# Patient Record
Sex: FEMALE | Race: WHITE | Employment: OTHER | URBAN - METROPOLITAN AREA
[De-identification: names, ages, dates, MRNs, and addresses within clinical notes are randomized per-mention and may not be internally consistent; named-entity substitution may affect disease eponyms.]

---

## 2017-03-18 RX ORDER — LEVOTHYROXINE SODIUM 75 UG/1
TABLET ORAL
Qty: 90 TAB | Refills: 3 | Status: SHIPPED | OUTPATIENT
Start: 2017-03-18 | End: 2018-02-15 | Stop reason: SDUPTHER

## 2017-03-22 ENCOUNTER — HOSPITAL ENCOUNTER (OUTPATIENT)
Dept: MAMMOGRAPHY | Age: 77
Discharge: HOME | End: 2017-03-22
Attending: FAMILY MEDICINE
Payer: MEDICARE

## 2017-03-22 DIAGNOSIS — Z12.31 VISIT FOR SCREENING MAMMOGRAM: ICD-10-CM

## 2017-03-22 PROCEDURE — 77067 SCR MAMMO BI INCL CAD: CPT

## 2017-07-26 ENCOUNTER — TELEPHONE (OUTPATIENT)
Dept: ENDOCRINOLOGY | Age: 77
End: 2017-07-26

## 2017-07-26 DIAGNOSIS — M81.0 AGE-RELATED OSTEOPOROSIS WITHOUT CURRENT PATHOLOGICAL FRACTURE: Primary | ICD-10-CM

## 2017-07-26 DIAGNOSIS — E23.2 DIABETES INSIPIDUS (HCC): Primary | ICD-10-CM

## 2017-07-26 DIAGNOSIS — E03.8 SECONDARY HYPOTHYROIDISM: ICD-10-CM

## 2017-07-26 DIAGNOSIS — M81.0 AGE-RELATED OSTEOPOROSIS WITHOUT CURRENT PATHOLOGICAL FRACTURE: ICD-10-CM

## 2017-08-03 ENCOUNTER — TELEPHONE (OUTPATIENT)
Dept: ENDOCRINOLOGY | Age: 77
End: 2017-08-03

## 2017-08-03 NOTE — TELEPHONE ENCOUNTER
Patient wanted to know if how she should do her desmopressin with her doing the prep for the colonoscopy.

## 2017-08-03 NOTE — TELEPHONE ENCOUNTER
Patient called to ask some questions about a colonoscopy she will be having on August 30th. She can be reached at:  [phone].

## 2017-08-10 ENCOUNTER — HOSPITAL ENCOUNTER (OUTPATIENT)
Dept: BONE DENSITY | Age: 77
Discharge: HOME | End: 2017-08-10
Attending: INTERNAL MEDICINE
Payer: MEDICARE

## 2017-08-10 DIAGNOSIS — M81.0 AGE-RELATED OSTEOPOROSIS WITHOUT CURRENT PATHOLOGICAL FRACTURE: ICD-10-CM

## 2017-08-10 PROCEDURE — 77080 DXA BONE DENSITY AXIAL: CPT

## 2017-08-24 LAB
25(OH)D3+25(OH)D2 SERPL-MCNC: 48.1 NG/ML (ref 30–100)
BUN SERPL-MCNC: 15 MG/DL (ref 8–27)
BUN/CREAT SERPL: 19 (ref 12–28)
CALCIUM SERPL-MCNC: 9.6 MG/DL (ref 8.7–10.3)
CHLORIDE SERPL-SCNC: 99 MMOL/L (ref 96–106)
CO2 SERPL-SCNC: 26 MMOL/L (ref 18–29)
CREAT SERPL-MCNC: 0.8 MG/DL (ref 0.57–1)
GLUCOSE SERPL-MCNC: 84 MG/DL (ref 65–99)
POTASSIUM SERPL-SCNC: 4 MMOL/L (ref 3.5–5.2)
PROLACTIN SERPL-MCNC: 0.1 NG/ML (ref 4.8–23.3)
SODIUM SERPL-SCNC: 140 MMOL/L (ref 134–144)
T4 FREE SERPL-MCNC: 1.53 NG/DL (ref 0.82–1.77)

## 2017-08-29 ENCOUNTER — OFFICE VISIT (OUTPATIENT)
Dept: ENDOCRINOLOGY | Age: 77
End: 2017-08-29

## 2017-08-29 ENCOUNTER — TELEPHONE (OUTPATIENT)
Dept: ENDOCRINOLOGY | Age: 77
End: 2017-08-29

## 2017-08-29 VITALS
HEART RATE: 61 BPM | SYSTOLIC BLOOD PRESSURE: 146 MMHG | HEIGHT: 62 IN | DIASTOLIC BLOOD PRESSURE: 59 MMHG | BODY MASS INDEX: 20.83 KG/M2 | WEIGHT: 113.2 LBS

## 2017-08-29 DIAGNOSIS — E03.8 SECONDARY HYPOTHYROIDISM: ICD-10-CM

## 2017-08-29 DIAGNOSIS — E23.2 DIABETES INSIPIDUS (HCC): ICD-10-CM

## 2017-08-29 DIAGNOSIS — E27.40 ADRENAL INSUFFICIENCY (HCC): ICD-10-CM

## 2017-08-29 DIAGNOSIS — M81.0 AGE-RELATED OSTEOPOROSIS WITHOUT CURRENT PATHOLOGICAL FRACTURE: ICD-10-CM

## 2017-08-29 DIAGNOSIS — E23.6 PITUITARY CYST (HCC): Primary | ICD-10-CM

## 2017-08-29 RX ORDER — ACETAMINOPHEN 500 MG
TABLET ORAL AS NEEDED
COMMUNITY

## 2017-08-29 NOTE — PROGRESS NOTES
Chief Complaint   Patient presents with    Thyroid Problem     pcp and pharmacy confirmed     History of Present Illness: Sosa Araya is a 77 y.o. female here for follow up of thyroid. Weight down 3 lbs since last visit in 8/16. No double vision or change in peripheral vision or blurry vision. Still taking cabergoline 1 tab 2x/week. Has only been walking 2-3 times a week for less than 30 minutes at a time and we discussed increasing this to help with bone health. No falls of fractures. Compliant with vitamin D. No dizziness or abd pain or n/v on the [facility].. No excess thirst or urination on the DDAVP. Due for repeat imaging with Dr. Valencia Ayon this year and will be seeing Dr. Jeaan Gant in 9/17. Compliant with levothyroxine at bedtime. Bowels are regular. No hair loss or brittle nails. Has some fatigue. Wearing medic alert bracelet today. Current Outpatient Prescriptions   Medication Sig    acetaminophen (TYLENOL EXTRA STRENGTH) 500 mg tablet Take  by mouth as needed for Pain.  levothyroxine (SYNTHROID) 75 mcg tablet TAKE 1 TABLET EVERY DAY IN THE MORNING  BEFORE  BREAKFAST    hydrocortisone (CORTEF) 5 mg tablet TAKE 2 TABLETS EVERY MORNING AND 1 TABLET AT 4 TO 5 PM    cabergoline (DOSTINEX) 0.5 mg tablet TAKE 1 TABLET TWICE WEEKLY    desmopressin (DDAVP) 10 mcg/spray (0.1 mL) solution 1 Spray by Left Nostril route as directed. Every night    cholecalciferol, vitamin D3, (VITAMIN D3) 2,000 unit Tab Take  by mouth daily.  CALCIUM CARBONATE/VITAMIN D3 (CALCIUM 600 + D,3, PO) Take  by mouth daily.  atenolol (TENORMIN) 25 mg tablet Take 25 mg by mouth daily.  aspirin 81 mg tablet Take 81 mg by mouth daily. No current facility-administered medications for this visit.       Allergies   Allergen Reactions    Amoxicillin Hives     Review of Systems:  - Cardiovascular: no chest pain  - Neurological: no tremors  - Integumentary: skin is normal    Physical Examination:  Blood pressure 146/59, pulse 61, height 5' 2\" (1.575 m), weight 113 lb 3.2 oz (51.3 kg). - General: pleasant, no distress, good eye contact   - Neck: no thyromegaly or carotid bruits  - Cardiovascular: regular, normal rate, nl s1 and s2, no m/r/g   - Respiratory: clear to auscultation bilaterally   - Integumentary: skin is normal, no edema  - Neurological: reflexes 2+ at biceps, no tremors  - Psychiatric: normal mood and affect    Data Reviewed:   Component      Latest Ref Rng & Units 8/23/2017 8/23/2017 8/23/2017 8/23/2017          10:35 AM 10:35 AM 10:35 AM 10:35 AM   Glucose      65 - 99 mg/dL   84    BUN      8 - 27 mg/dL   15    Creatinine      0.57 - 1.00 mg/dL   0.80    GFR est non-AA      >59 mL/min/1.73   72    GFR est AA      >59 mL/min/1.73   83    BUN/Creatinine ratio      12 - 28   19    Sodium      134 - 144 mmol/L   140    Potassium      3.5 - 5.2 mmol/L   4.0    Chloride      96 - 106 mmol/L   99    CO2      18 - 29 mmol/L   26    Calcium      8.7 - 10.3 mg/dL   9.6    Prolactin      4.8 - 23.3 ng/mL    0.1 (L)   T4, Free      0.82 - 1.77 ng/dL  1.53     VITAMIN D, 25-HYDROXY      30.0 - 100.0 ng/mL 48.1        Bone Mineral Density      Indication:  Osteoporosis on Fosamax  Age: 77  Sex: Female.     Menopause status: postmenopausal.  Hormone replacement therapy: No      Number of falls in the past year:   None. Risk factors for osteoporosis:  Steroid use, low body weight       Current medication for osteoporosis: Calcium and vitamin D.  Prior Fosamax      Comparison: 2015      Technique: Imaging was performed on the [employer] QDR 4500 C.      Excluded sites: 0      Findings:      Femoral Neck:  Left  Bone mineral density (gm/cm2):? 0.565  % of peak bone mass: 67  % for age matched controls:? 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 Mauricio Boyd. She had visual field disturbance prior to surgery but per pt, this was normal in September 2012 with Dr. Wolf Triplett. She has had repeat MRI with Dr. Mauricio Boyd in September 2012 and the cyst is still present. She did have a prolactin of 52.8 prior to surgery and repeat was still elevated at 53 in March. Her level was higher at 78 in April and 71 in July and 80 in October. I discussed with Dr. Mauricio Boyd about a trial of cabergoline 0.5 mg 2x/week and this was started in October and her prolactin level was <.0.1 in Jan 2013. I decreased her dose to 1/2 tab 2x/week. I spoke with Dr. Mauricio Boyd at that time and we agreed to hold on repeating an MRI until another 3 months of treatment to see if we can keep her prolactin level in the normal or below normal range to hopefully shrink any residual tissue. In April 2013, her level was still undetectable so she went for repeat MRI in 5/13 that showed the volume of her pituitary mass had decreased from 1.3x1.4x0.9 cm = 1.63 cubic centimers to 0.9x1.16x0.8 cm= 0.84 cubic centimeters which is about a 50% decrease in volume. However in 6/14 her MRI showed her cyst to be 0.9x1. 3x0.9 cm = 1.05 cubic centimeters and even though the report said this was stable, given this was a slight increase from the prior year I decided to increase her dose back to a whole tablet 2x/week to try and further shrink her cyst.  It remained at 0.9x1. 3x0.9 cm in 8/15 and plan per Dr. Mauricio Boyd is for repeat MRI in 2 years and I think this is reasonable given her prolactin has remained undetectable since 1/13.    - cont cabergoline whole 0.5 mg tab 2x/week   - check prolactin prior to next visit  - repeat MRI now  - f/u with Dr. Lui Sawyer for visual field testing as needed in the future (last done in 8/16 and was normal)       2. 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 When she tried to taper to 1 spray every other night, had increase in her urine output and thirst so likely this is permanent and will have her stay at 1 spray at bedtime. Her sodium level has been normal on this dose. - check bmp prior to next visit  - cont DDAVP 1 spray at bedtime every night      3. Secondary hypothyroidism: was started on Synthroid 50 mcg daily based on a low total T4 of 3.1 with an inappropriately normal TSH of 0.777 in January 2012 prior to her surgery. I wasn't sure she truly had hypothyroidism that would necessitate life long treatment. Her repeat labs in March on this dose showed a low TSH of 0.097 with a normal free T4 of 1.07. I had her stop the dose in March and off treatment she didn't feel much different but her free T4 was slightly low at 0.8 and her TSH was normal at 1.43 in April. Given that she was not symptomatic, I held on restarting treatment but her free T4 was lower at 0.75 in July 2012 and she was having more fatigue so I restarted this and she has felt better. Free T4 normal at 1.07 in Jan 2013 with a low TSH of 0.038. I will just follow the free T4 as the TSH is likely to be low in someone with a history of pituitary surgery and repeat free T4 was 1.0 in April 2013 and 1.08 in 8/13 and 1.1 in 2/14.   Down to 1.04 in 8/14 and given she had some fatigue and weight gain, I increased her dose to 75 mcg daily to see if this helps and it has and free T4 was 1.33 in 2/15 and 1.43 in 8/15 and 1.29 in 8/16 and 1.53 in 8/17.  - cont levothyroxine 75 mcg daily  - check free T4 prior to next visit 4. Adrenal insufficiency: She was started on hydrocortisone 20 mg in am and 10 mg in pm after surgery. Her electrolytes have been fine so I had hoped she would not need long term steroids, especially given her history of osteoporosis. I decreased her to 15/5 in March but her morning cortisol was 2.8 in April after holding her pm dose. Her level was up to 6.6 in July so I cut back to 10/5 but her morning cortisol was 1.1 in October 2012 so she will definitely need this long term. Will not continue to check cortisol levels in the future. Advised her to get a medic alert bracelet and she has done this. Previously advised her to double her dose of HC in times of stress or infection in the future and again reminded her today  - cont HC 10 mg in am and 5 mg in pm  - wear medic alert bracelet      5. Osteoporosis:  Her vitamin D level was slightly low at 24 in April 2012 and after taking 2000 units/day it was up to 35.6 in July and 31.7 in October and 33.5 in 4/13 and 40 in 2/14 and 50 in 2/15 and 40 in 8/15 and 36.3 in 8/16 and 48 in 8/17. She had a DXA in 11/10 that showed a T-score of -3.0 at the L-spine, 0.0 at the wrist and -2.2 at the left femoral neck. She had been on treatment for about 10 years with one break of undetermined duration. I repeated a scan in May 2013. This showed a significant 13.5% increase in total hip compared to 2010 so I stopped the fosamax at that time and repeated a bone density scan in 7/15 which showed a 3.7% decrease at her right total hip and 1.4% decrease at her left total hip but both locations are in the osteopenic range. Her spine cannot be accurately assessed due to scoliosis so do not feel the 3.6% decrease as listed above is accurate. We decided to hold on any new meds at this time and simply repeat her DXA in 2 years.   This showed her spine had a 1.8% decrease and her hip had a 2.1% decrease but both were not significant and we agreed to hold on any treatment for another 2 years and focus on increasing her weight bearing activity through walking. Given her calcium level was 10.4 in 2/15, I cut back on calcium to once daily and level has been normal since. - cont calcium once daily  - check Vitamin D 25-OH level in 2019  - cont vitamin D 2000 units daily   - order repeat DXA in Aug 2019          Patient Instructions   1) Your prolactin is still very low so keep taking the cabergoline 1 tab 2x/week. Plan on contacting Dr. Tulio Owens to arrange a repeat MRI of your pituitary and discuss with Dr. Becky Dyer whether another visual field test is needed but my guess is this is not needed this year as it was normal last year. 2) Your free T4 (thyroid level) is normal.    3) Your vitamin D is normal.    4) Your bone density scan shows your spine and hip are just slightly lower than 2 years ago but I think we can still hold on any medication. Plan on increasing your walking to 30 minutes 5 times a week. This does not have to be done altogether but can be split up throughout the day. We will repeat your bone density in 2 more years. 5) I will send you a reminder letter 3-4 weeks prior to your next visit and you will have the order already in the [facility] system so you can just go sometime in the 3-7 days before the next visit to have your labs drawn. Follow-up Disposition:  Return in about 1 year (around 8/29/2018).     Copy sent to:  Dr. Lucien Dawn Dr. Lazarus Largo Dr. Antonina Dyer (fax #[phone].)

## 2017-08-29 NOTE — TELEPHONE ENCOUNTER
Patient needs the name of the supplement Dr. Dayday Turcios was going to give her, to help with muscle spasms. She can be reached at:  [phone].

## 2017-08-29 NOTE — MR AVS SNAPSHOT
Visit Information Date & Time Provider Department Dept. Phone Encounter #  
 8/29/2017  2:10 PM Jeannette Alva, [address] [place] [address] Diabetes and Endocrinology [phone] [id number] Follow-up Instructions Return in about 1 year (around 8/29/2018). Upcoming Health Maintenance Date Due DTaP/Tdap/Td series (1 - Tdap) 10/21/1962 ZOSTER VACCINE AGE 60> 8/21/2001 Pneumococcal 65+ Low/Medium Risk (1 of 2 - PCV13) 10/21/2006 MEDICARE YEARLY EXAM 10/21/2006 INFLUENZA AGE 9 TO ADULT 8/1/2017 GLAUCOMA SCREENING Q2Y 7/28/2018 Allergies as of 8/29/2017  Review Complete On: 8/29/2017 By: Jeannette Alva MD  
  
 Severity Noted Reaction Type Reactions Amoxicillin  07/20/2012    Hives Current Immunizations  Never Reviewed No immunizations on file. Not reviewed this visit You Were Diagnosed With   
  
 Codes Comments [facility]    -  Primary ICD-10-CM: E23.6 ICD-9-CM: 253.8 Diabetes insipidus ([facility]     ICD-10-CM: E23.2 ICD-9-CM: 253.5 Secondary hypothyroidism     ICD-10-CM: E03.8 ICD-9-CM: 244.8 Adrenal insufficiency ([facility]     ICD-10-CM: E27.40 ICD-9-CM: 255.41 Age-related osteoporosis without current pathological fracture     ICD-10-CM: M81.0 ICD-9-CM: 733.01 Vitals BP Pulse Height(growth percentile) Weight(growth percentile) BMI OB Status 146/59 61 5' 2\" (1.575 m) 113 lb 3.2 oz (51.3 kg) 20.7 kg/m2 Postmenopausal  
 Smoking Status Never Smoker Vitals History BMI and BSA Data Body Mass Index Body Surface Area 20.7 kg/m 2 1.5 m 2 [facility] [address] [phone] Your Updated Medication List  
  
   
This list is accurate as of: 8/29/17  2:53 PM.  Always use your most recent med list.  
  
  
  
  
 aspirin 81 mg tablet Take 81 mg by mouth daily. atenolol 25 mg tablet Commonly known as:  TENORMIN Take 25 mg by mouth daily. cabergoline 0.5 mg tablet Commonly known as:  DOSTINEX  
TAKE 1 TABLET TWICE WEEKLY  
  
 CALCIUM 600 + D(3) PO Take  by mouth daily. desmopressin 10 mcg/spray (0.1 mL) solution Commonly known as:  DDAVP  
1 Spray by Left Nostril route as directed. Every night  
  
 hydrocortisone 5 mg tablet Commonly known as:  CORTEF  
TAKE 2 TABLETS EVERY MORNING AND 1 TABLET AT 4 TO 5 PM  
  
 levothyroxine 75 mcg tablet Commonly known as:  SYNTHROID  
TAKE 1 TABLET EVERY DAY IN THE MORNING  BEFORE  BREAKFAST  
  
 TYLENOL EXTRA STRENGTH 500 mg tablet Generic drug:  acetaminophen Take  by mouth as needed for Pain. VITAMIN D3 2,000 unit Tab Generic drug:  cholecalciferol (vitamin D3) Take  by mouth daily. Follow-up Instructions Return in about 1 year (around 8/29/2018). To-Do List   
 07/29/2018 Lab:  METABOLIC PANEL, BASIC   
  
 07/29/2018 Lab:  PROLACTIN   
  
 07/29/2018 Lab:  TSH 3RD GENERATION Patient Instructions 1) Your prolactin is still very low so keep taking the cabergoline 1 tab 2x/week. Plan on contacting Dr. Adolfo Talley to arrange a repeat MRI of your pituitary and discuss with Dr. Lui Sawyer whether another visual field test is needed but my guess is this is not needed this year as it was normal last year. 2) Your free T4 (thyroid level) is normal. 
 
3) Your vitamin D is normal. 
 
4) Your bone density scan shows your spine and hip are just slightly lower than 2 years ago but I think we can still hold on any medication. Plan on increasing your walking to 30 minutes 5 times a week. This does not have to be done altogether but can be split up throughout the day. We will repeat your bone density in 2 more years.    
 
5) I will send you a reminder letter 3-4 weeks prior to your next visit and you will have the order already in the [facility] system so you can just go sometime in the 3-7 days before the next visit to have your labs drawn. Introducing [facility]! Larisa Coffman introduces [employer] patient portal. Now you can access parts of your medical record, email your doctor's office, and request medication refills online. 1. In your internet browser, go to [email or web address] 2. Click on the First Time User? Click Here link in the Sign In box. You will see the New Member Sign Up page. 3. Enter your [employer] Access Code exactly as it appears below. You will not need to use this code after youve completed the sign-up process. If you do not sign up before the expiration date, you must request a new code. · [employer]The[employer] Access Code: [email or web address] Expires: 10/25/2017  9:08 AM 
 
4. Enter the last four digits of your Social Security Number (xxxx) and Date of Birth (mm/dd/yyyy) as indicated and click Submit. You will be taken to the next sign-up page. 5. Create a [employer]Gl[employer] ID. This will be your [employer] login ID and cannot be changed, so think of one that is secure and easy to remember. 6. Create a [employer] password. You can change your password at any time. 7. Enter your Password Reset Question and Answer. This can be used at a later time if you forget your password. 8. Enter your e-mail address. You will receive e-mail notification when new information is available in [address] 9. Click Sign Up. You can now view and download portions of your medical record. 10. Click the Download Summary menu link to download a portable copy of your medical information. If you have questions, please visit the Frequently Asked Questions section of the [employer] website. Remember, [employer]The[employer] is NOT to be used for urgent needs. For medical emergencies, dial 911. Now available from your iPhone and Android! Please provide this summary of care documentation to your next provider. Your primary care clinician is listed as KALANI Garrison. If you have any questions after today's visit, please call [phone].

## 2017-08-29 NOTE — PATIENT INSTRUCTIONS
1) Your prolactin is still very low so keep taking the cabergoline 1 tab 2x/week. Plan on contacting Dr. Femi Bassett to arrange a repeat MRI of your pituitary and discuss with Dr. Alex Ríos whether another visual field test is needed but my guess is this is not needed this year as it was normal last year. 2) Your free T4 (thyroid level) is normal.    3) Your vitamin D is normal.    4) Your bone density scan shows your spine and hip are just slightly lower than 2 years ago but I think we can still hold on any medication. Plan on increasing your walking to 30 minutes 5 times a week. This does not have to be done altogether but can be split up throughout the day. We will repeat your bone density in 2 more years. 5) I will send you a reminder letter 3-4 weeks prior to your next visit and you will have the order already in the [facility] system so you can just go sometime in the 3-7 days before the next visit to have your labs drawn.

## 2017-08-30 ENCOUNTER — ANESTHESIA EVENT (OUTPATIENT)
Dept: ENDOSCOPY | Age: 77
End: 2017-08-30
Payer: MEDICARE

## 2017-08-30 ENCOUNTER — ANESTHESIA (OUTPATIENT)
Dept: ENDOSCOPY | Age: 77
End: 2017-08-30
Payer: MEDICARE

## 2017-08-30 ENCOUNTER — HOSPITAL ENCOUNTER (OUTPATIENT)
Age: 77
Setting detail: OUTPATIENT SURGERY
Discharge: HOME | End: 2017-08-30
Attending: INTERNAL MEDICINE | Admitting: INTERNAL MEDICINE
Payer: MEDICARE

## 2017-08-30 VITALS
HEIGHT: 62 IN | TEMPERATURE: 97.5 F | HEART RATE: 98 BPM | RESPIRATION RATE: 16 BRPM | OXYGEN SATURATION: 100 % | DIASTOLIC BLOOD PRESSURE: 60 MMHG | SYSTOLIC BLOOD PRESSURE: 165 MMHG | BODY MASS INDEX: 20.43 KG/M2 | WEIGHT: 111 LBS

## 2017-08-30 PROCEDURE — 76060000032 HC ANESTHESIA 0.5 TO 1 HR: Performed by: INTERNAL MEDICINE

## 2017-08-30 PROCEDURE — 74011000250 HC RX REV CODE- 250

## 2017-08-30 PROCEDURE — 76040000007: Performed by: INTERNAL MEDICINE

## 2017-08-30 PROCEDURE — 74011250636 HC RX REV CODE- 250/636

## 2017-08-30 PROCEDURE — 74011250636 HC RX REV CODE- 250/636: Performed by: INTERNAL MEDICINE

## 2017-08-30 RX ORDER — ATROPINE SULFATE 0.1 MG/ML
0.5 INJECTION INTRAVENOUS
Status: DISCONTINUED | OUTPATIENT
Start: 2017-08-30 | End: 2017-08-30 | Stop reason: HOSPADM

## 2017-08-30 RX ORDER — SODIUM CHLORIDE 0.9 % (FLUSH) 0.9 %
5-10 SYRINGE (ML) INJECTION AS NEEDED
Status: DISCONTINUED | OUTPATIENT
Start: 2017-08-30 | End: 2017-08-30 | Stop reason: HOSPADM

## 2017-08-30 RX ORDER — SODIUM CHLORIDE 0.9 % (FLUSH) 0.9 %
5-10 SYRINGE (ML) INJECTION EVERY 8 HOURS
Status: DISCONTINUED | OUTPATIENT
Start: 2017-08-30 | End: 2017-08-30 | Stop reason: HOSPADM

## 2017-08-30 RX ORDER — SODIUM CHLORIDE 9 MG/ML
75 INJECTION, SOLUTION INTRAVENOUS CONTINUOUS
Status: DISCONTINUED | OUTPATIENT
Start: 2017-08-30 | End: 2017-08-30 | Stop reason: HOSPADM

## 2017-08-30 RX ORDER — NALOXONE HYDROCHLORIDE 0.4 MG/ML
0.4 INJECTION, SOLUTION INTRAMUSCULAR; INTRAVENOUS; SUBCUTANEOUS
Status: DISCONTINUED | OUTPATIENT
Start: 2017-08-30 | End: 2017-08-30 | Stop reason: HOSPADM

## 2017-08-30 RX ORDER — FLUMAZENIL 0.1 MG/ML
0.2 INJECTION INTRAVENOUS
Status: DISCONTINUED | OUTPATIENT
Start: 2017-08-30 | End: 2017-08-30 | Stop reason: HOSPADM

## 2017-08-30 RX ORDER — LIDOCAINE HYDROCHLORIDE 20 MG/ML
INJECTION, SOLUTION EPIDURAL; INFILTRATION; INTRACAUDAL; PERINEURAL AS NEEDED
Status: DISCONTINUED | OUTPATIENT
Start: 2017-08-30 | End: 2017-08-30 | Stop reason: HOSPADM

## 2017-08-30 RX ORDER — EPINEPHRINE 0.1 MG/ML
1 INJECTION INTRACARDIAC; INTRAVENOUS
Status: DISCONTINUED | OUTPATIENT
Start: 2017-08-30 | End: 2017-08-30 | Stop reason: HOSPADM

## 2017-08-30 RX ORDER — DEXTROMETHORPHAN/PSEUDOEPHED 2.5-7.5/.8
1.2 DROPS ORAL
Status: DISCONTINUED | OUTPATIENT
Start: 2017-08-30 | End: 2017-08-30 | Stop reason: HOSPADM

## 2017-08-30 RX ORDER — MIDAZOLAM HYDROCHLORIDE 1 MG/ML
1-3 INJECTION, SOLUTION INTRAMUSCULAR; INTRAVENOUS
Status: DISCONTINUED | OUTPATIENT
Start: 2017-08-30 | End: 2017-08-30 | Stop reason: HOSPADM

## 2017-08-30 RX ORDER — PROPOFOL 10 MG/ML
INJECTION, EMULSION INTRAVENOUS AS NEEDED
Status: DISCONTINUED | OUTPATIENT
Start: 2017-08-30 | End: 2017-08-30 | Stop reason: HOSPADM

## 2017-08-30 RX ADMIN — SODIUM CHLORIDE 75 ML/HR: 900 INJECTION, SOLUTION INTRAVENOUS at 09:58

## 2017-08-30 RX ADMIN — PROPOFOL 100 MG: 10 INJECTION, EMULSION INTRAVENOUS at 10:45

## 2017-08-30 RX ADMIN — PROPOFOL 100 MG: 10 INJECTION, EMULSION INTRAVENOUS at 10:38

## 2017-08-30 RX ADMIN — PROPOFOL 30 MG: 10 INJECTION, EMULSION INTRAVENOUS at 10:47

## 2017-08-30 RX ADMIN — LIDOCAINE HYDROCHLORIDE 20 MG: 20 INJECTION, SOLUTION EPIDURAL; INFILTRATION; INTRACAUDAL; PERINEURAL at 10:36

## 2017-08-30 NOTE — DISCHARGE INSTRUCTIONS
Alfredo Zhang  [id number]  1940    COLON DISCHARGE INSTRUCTIONS  Discomfort:  Redness at IV site- apply warm compress to area; if redness or soreness persist- contact your physician  There may be a slight amount of blood passed from the rectum  Gaseous discomfort- walking, belching will help relieve any discomfort  You may not operate a vehicle for 12 hours  You may not engage in an occupation involving machinery or appliances for rest of today  You may not drink alcoholic beverages for at least 12 hours  Avoid making any critical decisions for at least 24 hour  DIET:   Regular diet. - however -  remember your colon is empty and a heavy meal will produce gas. Avoid these foods:  vegetables, fried / greasy foods, carbonated drinks for today  MEDICATION:     ACTIVITY:  You may not resume your normal daily activities until tomorrow AM; it is recommended that you spend the remainder of the day resting -  avoid any strenuous activity. CALL M.D. ANY SIGN OF:   Increasing pain, nausea, vomiting  Abdominal distension (swelling)  New increased bleeding (oral or rectal)  Fever (chills)    IMPRESSION:  Impression:    1. Mild sigmoid diverticulosis  2. Large internal hemorrhoids  3. Otherwise normal colonoscopy through to the cecum (notably no polyps/masses). Recommendations:   1. High fiber diet  2. Given normal colonoscopy with good prep (and no h/o polyps) at age 77 would not recommend further colonoscopies for pure screening purposes. Follow-up Instructions:  Telephone # Catrina Spence MD  .AudioName Activation    Thank you for requesting access to [employer]. Please follow the instructions below to securely access and download your online medical record. [employer] allows you to send messages to your doctor, view your test results, renew your prescriptions, schedule appointments, and more. How Do I Sign Up? 1. In your internet browser, go to [email or web address]  2.  Click on the First Time User? Click Here link in the Sign In box. You will be redirect to the New Member Sign Up page. 3. Enter your [employer] Access Code exactly as it appears below. You will not need to use this code after youve completed the sign-up process. If you do not sign up before the expiration date, you must request a new code. [employer] Access Code: [email or web address]  Expires: 10/25/2017  9:08 AM (This is the date your [employer] access code will )    4. Enter the last four digits of your Social Security Number (xxxx) and Date of Birth (mm/dd/yyyy) as indicated and click Submit. You will be taken to the next sign-up page. 5. Create a [employer] ID. This will be your [employer] login ID and cannot be changed, so think of one that is secure and easy to remember. 6. Create a [employer] password. You can change your password at any time. 7. Enter your Password Reset Question and Answer. This can be used at a later time if you forget your password. 8. Enter your e-mail address. You will receive e-mail notification when new information is available in [address] 9. Click Sign Up. You can now view and download portions of your medical record. 10. Click the Download Summary menu link to download a portable copy of your medical information. Additional Information    If you have questions, please visit the Frequently Asked Questions section of the [employer] website at [email or web address] Remember, [employer] is NOT to be used for urgent needs. For medical emergencies, dial 911.

## 2017-08-30 NOTE — PROCEDURES
NAME:  Sosa Araya   :   1940   MRN:   [id number]     Date/Time:  2017 10:52 AM    Colonoscopy Operative Report    Procedure Type:   Colonoscopy --diagnostic     Indications:   Fecal occult blood positive (+Cologuard)  Pre-operative Diagnosis: see indication above  Post-operative Diagnosis:  See findings below  :  Lina Hooper MD  Referring Provider: --Ivonne Moreno MD    Exam:  Airway: clear, no airway problems anticipated  Heart: RRR, without gallops or rubs  Lungs: clear bilaterally without wheezes, crackles, or rhonchi  Abdomen: soft, nontender, nondistended, bowel sounds present  Mental Status: awake, alert and oriented to person, place and time    Sedation:  MAC anesthesia Propofol  Procedure Details:  After informed consent was obtained with all risks and benefits of procedure explained and preoperative exam completed, the patient was taken to the endoscopy suite and placed in the left lateral decubitus position. Upon sequential sedation as per above, a digital rectal exam was performed demonstrating internal hemorrhoids. The Olympus videocolonoscope  was inserted in the rectum and carefully advanced to the cecum, which was identified by the ileocecal valve and appendiceal orifice. The quality of preparation was good. The colonoscope was slowly withdrawn with careful evaluation between folds. Retroflexion in the rectum was completed demonstrating internal hemorrhoids. Findings:   1. Mild sigmoid diverticulosis  2. Large internal hemorrhoids  3. Otherwise normal colonoscopy through to the cecum (notably no polyps/masses). Specimen Removed:  None  Complications: None. EBL:  None. Impression:    1. Mild sigmoid diverticulosis  2. Large internal hemorrhoids  3. Otherwise normal colonoscopy through to the cecum (notably no polyps/masses). Recommendations:   1. High fiber diet  2.  Given normal colonoscopy with good prep (and no h/o polyps) at age 77 would not recommend further colonoscopies for pure screening purposes. Discharge Disposition:  Home in the company of a  when able to ambulate.       Darline Eckert MD

## 2017-08-30 NOTE — ANESTHESIA POSTPROCEDURE EVALUATION
Post-Anesthesia Evaluation and Assessment    Patient: Ashlyn Fried MRN: [id number]  SSN: [id number]    YOB: 1940  Age: 77 y.o. Sex: female       Cardiovascular Function/Vital Signs  Visit Vitals    /66    Pulse 80    Temp 36.6 °C (97.9 °F)    Resp 17    Ht 5' 2\" (1.575 m)    Wt 50.3 kg (111 lb)    SpO2 100%    BMI 20.3 kg/m2       Patient is status post general, total IV anesthesia anesthesia for Procedure(s):  COLONOSCOPY. Nausea/Vomiting: None    Postoperative hydration reviewed and adequate. Pain:  Pain Scale 1: Numeric (0 - 10) (08/30/17 0955)  Pain Intensity 1: 0 (08/30/17 0955)   Managed    Neurological Status: At baseline    Mental Status and Level of Consciousness: Arousable    Pulmonary Status:   O2 Device: CO2 nasal cannula (08/30/17 1050)   Adequate oxygenation and airway patent    Complications related to anesthesia: None    Post-anesthesia assessment completed.  No concerns    Signed By: Mortimer Pester, MD     August 30, 2017

## 2017-08-30 NOTE — PROGRESS NOTES
Apoorva Amezcua  1940  [id number]    Situation:  Verbal report received from: ALINA Thompson RN  Procedure: Procedure(s):  COLONOSCOPY    Background:    Preoperative diagnosis: OCCULT BLOOD IN STOOLS  Postoperative diagnosis: hemorrhoids, diverticulosis,     :  Dr. Singh Forrest  Assistant(s): Endoscopy Technician-1: Betty Esquivel  Endoscopy RN-1: Baljinder Mohr    Specimens: no  H.  Pylori  yes    Assessment:  Intra-procedure medications     Anesthesia gave intra-procedure sedation and medications, see anesthesia flow sheet  yes    Intravenous fluids: NS@ KVO     Vital signs stable  yes    Abdominal assessment: round and soft   yes    Recommendation:  Discharge patient per MD order  yes  Return to floor  NA  Family or Friend   yes  Permission to share finding with family or friend yes

## 2017-08-30 NOTE — ANESTHESIA PREPROCEDURE EVALUATION
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)  Diabetes Insipidus  Adrenal Insufficiency  Secondary Hypothyroidism Other Findings   Comments: Osteoporosis         Physical Exam    Airway  Mallampati: II  TM Distance: > 6 cm  Neck ROM: normal range of motion   Mouth opening: Normal     Cardiovascular  Regular rate and rhythm,  S1 and S2 normal,  no murmur, click, rub, or gallop             Dental  No notable dental hx       Pulmonary  Breath sounds clear to auscultation               Abdominal  GI exam deferred       Other Findings            Anesthetic Plan    ASA: 3  Anesthesia type: general and total IV anesthesia          Induction: Intravenous  Anesthetic plan and risks discussed with: Patient

## 2017-08-30 NOTE — PERIOP NOTES
Endoscope was pre-cleaned at the bedside immediately following procedure by [facility].

## 2017-08-30 NOTE — IP AVS SNAPSHOT
[address] [facility] 
[phone] Patient: Gill Agarwal MRN: [id number] CBJ:74/49/0527 You are allergic to the following Allergen Reactions Amoxicillin Hives Recent Documentation Height Weight BMI OB Status Smoking Status 1.575 m 50.3 kg 20.3 kg/m2 Postmenopausal Never Smoker Emergency Contacts Name Discharge Info Relation Home Work Mobile [facility] DISCHARGE CAREGIVER [3] Spouse [3] [phone] About your hospitalization You were admitted on:  August 30, 2017 You last received care in the:  [facility] ENDOSCOPY You were discharged on:  August 30, 2017 Unit phone number:  [phone] Why you were hospitalized Your primary diagnosis was:  Not on File Providers Seen During Your Hospitalizations Provider Role Specialty Primary office phone Kimberley Ely MD Attending Provider Gastroenterology [phone] Your Primary Care Physician (PCP) Primary Care Physician Office Phone Office Fax [address] [phone] Follow-up Information Follow up With Details Comments Contact Info Kayla Arredondo MD   [address] [place] [address] 
[phone] Current Discharge Medication List  
  
CONTINUE these medications which have NOT CHANGED Dose & Instructions Dispensing Information Comments Morning Noon Evening Bedtime  
 aspirin 81 mg tablet Your last dose was: Your next dose is:    
   
   
 Dose:  81 mg Take 81 mg by mouth daily. Refills:  0  
     
   
   
   
  
 atenolol 25 mg tablet Commonly known as:  TENORMIN Your last dose was: Your next dose is:    
   
   
 Dose:  25 mg Take 25 mg by mouth daily. Refills:  0  
     
   
   
   
  
 cabergoline 0.5 mg tablet Commonly known as:  DOSTINEX Your last dose was: Your next dose is: TAKE 1 TABLET TWICE WEEKLY Quantity:  24 Tab Refills:  3 CALCIUM 600 + D(3) PO Your last dose was: Your next dose is: Take  by mouth daily. Refills:  0  
     
   
   
   
  
 desmopressin 10 mcg/spray (0.1 mL) solution Commonly known as:  DDAVP Your last dose was: Your next dose is:    
   
   
 1 [place] by Left Nostril route as directed. Every night Quantity:  15 mL Refills:  3  
     
   
   
   
  
 hydrocortisone 5 mg tablet Commonly known as:  CORTEF Your last dose was: Your next dose is: TAKE 2 TABLETS EVERY MORNING AND 1 TABLET AT 4 TO 5 PM  
 Quantity:  270 Tab Refills:  3  
     
   
   
   
  
 levothyroxine 75 mcg tablet Commonly known as:  SYNTHROID Your last dose was: Your next dose is: TAKE 1 TABLET EVERY DAY IN THE MORNING  BEFORE  BREAKFAST Quantity:  90 Tab Refills:  3  
     
   
   
   
  
 TYLENOL EXTRA STRENGTH 500 mg tablet Generic drug:  acetaminophen Your last dose was: Your next dose is: Take  by mouth as needed for Pain. Refills:  0  
     
   
   
   
  
 VITAMIN D3 2,000 unit Tab Generic drug:  cholecalciferol (vitamin D3) Your last dose was: Your next dose is: Take  by mouth daily. Refills:  0 Discharge Instructions Erica Manrique [id number] 
1940 COLON DISCHARGE INSTRUCTIONS Discomfort: 
Redness at IV site- apply warm compress to area; if redness or soreness persist- contact your physician There may be a slight amount of blood passed from the rectum Gaseous discomfort- walking, belching will help relieve any discomfort You may not operate a vehicle for 12 hours You may not engage in an occupation involving machinery or appliances for rest of today You may not drink alcoholic beverages for at least 12 hours Avoid making any critical decisions for at least 24 hour DIET: 
 Regular diet.  however -  remember your colon is empty and a heavy meal will produce gas. Avoid these foods:  vegetables, fried / greasy foods, carbonated drinks for today MEDICATION: 
  
ACTIVITY: 
You may not resume your normal daily activities until tomorrow AM; it is recommended that you spend the remainder of the day resting -  avoid any strenuous activity. CALL M.D. ANY SIGN OF: Increasing pain, nausea, vomiting Abdominal distension (swelling) New increased bleeding (oral or rectal) Fever (chills) IMPRESSION: 
Impression: 1. Mild sigmoid diverticulosis 2. Large internal hemorrhoids 3. Otherwise normal colonoscopy through to the cecum (notably no polyps/masses). Recommendations: 1. High fiber diet 2. Given normal colonoscopy with good prep (and no h/o polyps) at age 77 would not recommend further colonoscopies for pure screening purposes. Follow-up Instructions: 
Telephone # [phone] Swapna Boone MD 
.[employer] Activation Thank you for requesting access to [employer]. Please follow the instructions below to securely access and download your online medical record. [employer] allows you to send messages to your doctor, view your test results, renew your prescriptions, schedule appointments, and more. How Do I Sign Up? 1. In your internet browser, go to [email or web address] 
2. Click on the First Time User? Click Here link in the Sign In box. You will be redirect to the New Member Sign Up page. 3. Enter your [employer] Access Code exactly as it appears below. You will not need to use this code after youve completed the sign-up process. If you do not sign up before the expiration date, you must request a new code. [employer] Access Code: [email or web address] Expires: 10/25/2017  9:08 AM (This is the date your [employer] access code will ) 4. Enter the last four digits of your Social Security Number (xxxx) and Date of Birth (mm/dd/yyyy) as indicated and click Submit. You will be taken to the next sign-up page. 5. Create a [employer] ID. This will be your [employer] login ID and cannot be changed, so think of one that is secure and easy to remember. 6. Create a [employer] password. You can change your password at any time. 7. Enter your Password Reset Question and Answer. This can be used at a later time if you forget your password. 8. Enter your e-mail address. You will receive e-mail notification when new information is available in [address] 9. Click Sign Up. You can now view and download portions of your medical record. 10. Click the Download Summary menu link to download a portable copy of your medical information. Additional Information If you have questions, please visit the Frequently Asked Questions section of the [employer] website at [email or web address] Remember, [employer] is NOT to be used for urgent needs. For medical emergencies, dial 911. Discharge Orders None Introducing [facility]! Alli Whittington introduces [employer] patient portal. Now you can access parts of your medical record, email your doctor's office, and request medication refills online. 1. In your internet browser, go to [email or web address] 2. Click on the First Time User? Click Here link in the Sign In box. You will see the New Member Sign Up page. 3. Enter your [employer] Access Code exactly as it appears below. You will not need to use this code after youve completed the sign-up process. If you do not sign up before the expiration date, you must request a new code. · [employer] Access Code: [email or web address] Expires: 10/25/2017  9:08 AM 
 
4. Enter the last four digits of your Social Security Number (xxxx) and Date of Birth (mm/dd/yyyy) as indicated and click Submit. You will be taken to the next sign-up page. 5. Create a [employer] ID. This will be your [employer] login ID and cannot be changed, so think of one that is secure and easy to remember. 6. Create a [employer] password. You can change your password at any time. 7. Enter your Password Reset Question and Answer. This can be used at a later time if you forget your password. 8. Enter your e-mail address. You will receive e-mail notification when new information is available in [address] 9. Click Sign Up. You can now view and download portions of your medical record. 10. Click the Download Summary menu link to download a portable copy of your medical information. If you have questions, please visit the Frequently Asked Questions section of the [employer] website. Remember, [employer] is NOT to be used for urgent needs. For medical emergencies, dial 911. Now available from your iPhone and Android! General Information Please provide this summary of care documentation to your next provider. Patient Signature:  ____________________________________________________________ Date:  ____________________________________________________________  
  
[facility] Provider Signature:  ____________________________________________________________ Date:  ____________________________________________________________

## 2017-08-30 NOTE — PERIOP NOTES
Anesthesia reports 230 mg Propofol, 20 mg Lidocaine, 10 mg Ephedrine and 700 mL NS given during procedure. Received report from anesthesia staff on vital signs and status of patient. Glasses returned to pt.

## 2017-09-01 RX ORDER — CABERGOLINE 0.5 MG/1
TABLET ORAL
Qty: 24 TAB | Refills: 3 | Status: SHIPPED | OUTPATIENT
Start: 2017-09-01 | End: 2018-07-02 | Stop reason: SDUPTHER

## 2017-10-24 RX ORDER — HYDROCORTISONE 5 MG/1
TABLET ORAL
Qty: 270 TAB | Refills: 3 | Status: SHIPPED | OUTPATIENT
Start: 2017-10-24 | End: 2018-08-27 | Stop reason: SDUPTHER

## 2018-02-12 RX ORDER — DESMOPRESSIN ACETATE 0.1 MG/ML
SOLUTION NASAL
Qty: 15 ML | Refills: 3 | Status: SHIPPED | OUTPATIENT
Start: 2018-02-12 | End: 2019-02-18 | Stop reason: SDUPTHER

## 2018-02-15 RX ORDER — LEVOTHYROXINE SODIUM 75 UG/1
TABLET ORAL
Qty: 90 TAB | Refills: 3 | Status: SHIPPED | OUTPATIENT
Start: 2018-02-15 | End: 2018-12-06 | Stop reason: SDUPTHER

## 2018-06-27 ENCOUNTER — HOSPITAL ENCOUNTER (OUTPATIENT)
Dept: MAMMOGRAPHY | Age: 78
Discharge: HOME | End: 2018-06-27
Attending: FAMILY MEDICINE
Payer: MEDICARE

## 2018-06-27 DIAGNOSIS — Z12.39 BREAST SCREENING: ICD-10-CM

## 2018-06-27 PROCEDURE — 77067 SCR MAMMO BI INCL CAD: CPT

## 2018-07-02 RX ORDER — CABERGOLINE 0.5 MG/1
TABLET ORAL
Qty: 24 TAB | Refills: 3 | Status: SHIPPED | OUTPATIENT
Start: 2018-07-02 | End: 2019-03-27 | Stop reason: SDUPTHER

## 2018-08-22 ENCOUNTER — TELEPHONE (OUTPATIENT)
Dept: ENDOCRINOLOGY | Age: 78
End: 2018-08-22

## 2018-08-22 DIAGNOSIS — E23.2 DIABETES INSIPIDUS (HCC): ICD-10-CM

## 2018-08-22 DIAGNOSIS — M81.0 AGE-RELATED OSTEOPOROSIS WITHOUT CURRENT PATHOLOGICAL FRACTURE: ICD-10-CM

## 2018-08-22 DIAGNOSIS — E23.6 PITUITARY CYST (HCC): ICD-10-CM

## 2018-08-22 DIAGNOSIS — E03.8 SECONDARY HYPOTHYROIDISM: ICD-10-CM

## 2018-08-22 DIAGNOSIS — E27.40 ADRENAL INSUFFICIENCY (HCC): ICD-10-CM

## 2018-08-22 NOTE — TELEPHONE ENCOUNTER
Patient has an appointment on Monday 8-27-18 with Dr. Reynaldo Mckeon, but the lab order in the system is too old. Can you please put a new order in the system?   The patient will go to [employer] ([phone].)

## 2018-08-22 NOTE — TELEPHONE ENCOUNTER
Bailey Nolasco,    The order in the system is fine to use as it was a future order and has just been released. She just ask to have labs drawn under my name. Thanks.

## 2018-08-23 ENCOUNTER — TELEPHONE (OUTPATIENT)
Dept: ENDOCRINOLOGY | Age: 78
End: 2018-08-23

## 2018-08-23 LAB
BUN SERPL-MCNC: 21 MG/DL (ref 8–27)
BUN/CREAT SERPL: 27 (ref 12–28)
CALCIUM SERPL-MCNC: 9.5 MG/DL (ref 8.7–10.3)
CHLORIDE SERPL-SCNC: 98 MMOL/L (ref 96–106)
CO2 SERPL-SCNC: 23 MMOL/L (ref 20–29)
CREAT SERPL-MCNC: 0.79 MG/DL (ref 0.57–1)
GLUCOSE SERPL-MCNC: 96 MG/DL (ref 65–99)
POTASSIUM SERPL-SCNC: 4.7 MMOL/L (ref 3.5–5.2)
PROLACTIN SERPL-MCNC: <0.1 NG/ML (ref 4.8–23.3)
SODIUM SERPL-SCNC: 137 MMOL/L (ref 134–144)
TSH SERPL DL<=0.005 MIU/L-ACNC: 0.01 UIU/ML (ref 0.45–4.5)

## 2018-08-25 LAB
SPECIMEN STATUS REPORT, ROLRST: NORMAL
T4 FREE SERPL-MCNC: 1.3 NG/DL (ref 0.82–1.77)

## 2018-08-27 ENCOUNTER — OFFICE VISIT (OUTPATIENT)
Dept: ENDOCRINOLOGY | Age: 78
End: 2018-08-27

## 2018-08-27 VITALS
DIASTOLIC BLOOD PRESSURE: 84 MMHG | HEART RATE: 72 BPM | WEIGHT: 119.2 LBS | BODY MASS INDEX: 21.94 KG/M2 | SYSTOLIC BLOOD PRESSURE: 154 MMHG | HEIGHT: 62 IN

## 2018-08-27 DIAGNOSIS — E03.8 SECONDARY HYPOTHYROIDISM: ICD-10-CM

## 2018-08-27 DIAGNOSIS — E23.6 PITUITARY CYST (HCC): Primary | ICD-10-CM

## 2018-08-27 DIAGNOSIS — E27.40 ADRENAL INSUFFICIENCY (HCC): ICD-10-CM

## 2018-08-27 DIAGNOSIS — E23.2 DIABETES INSIPIDUS (HCC): ICD-10-CM

## 2018-08-27 DIAGNOSIS — M81.0 AGE-RELATED OSTEOPOROSIS WITHOUT CURRENT PATHOLOGICAL FRACTURE: ICD-10-CM

## 2018-08-27 NOTE — MR AVS SNAPSHOT
[address] [address] [phone] Patient: Tova Hernadez MRN: [id number] LKI:[id number]/18/[id number] Visit Information Date & Time Provider Department Dept. Phone Encounter #  
 8/27/2018  1:30 PM Guanako Burgess, [address] [place] [address] Diabetes and Endocrinology [phone] [id number] Follow-up Instructions Return in about 1 year (around 8/27/2019). Upcoming Health Maintenance Date Due DTaP/Tdap/Td series (1 - Tdap) 10/21/1962 ZOSTER VACCINE AGE 60> 8/21/2001 Pneumococcal 65+ Low/Medium Risk (1 of 2 - PCV13) 10/21/2006 MEDICARE YEARLY EXAM 3/14/2018 GLAUCOMA SCREENING Q2Y 7/28/2018 Influenza Age 5 to Adult 8/1/2018 Allergies as of 8/27/2018  Review Complete On: 8/27/2018 By: Guanako Burgess MD  
  
 Severity Noted Reaction Type Reactions Amoxicillin  07/20/2012    Hives Current Immunizations  Never Reviewed No immunizations on file. Not reviewed this visit You Were Diagnosed With   
  
 Codes Comments Pituitary cyst ([facility] [address]    -  Primary ICD-10-CM: E23.6 ICD-9-CM: 253.8 Diabetes insipidus ([facility]     ICD-10-CM: E23.2 ICD-9-CM: 253.5 Secondary hypothyroidism     ICD-10-CM: E03.8 ICD-9-CM: 244.8 [facility]     ICD-10-CM: E27.40 ICD-9-CM: 255.41 Age-related osteoporosis without current pathological fracture     ICD-10-CM: M81.0 ICD-9-CM: 733.01 Vitals BP Pulse Height(growth percentile) Weight(growth percentile) BMI OB Status 154/84 72 5' 2\" (1.575 m) 119 lb 3.2 oz (54.1 kg) 21.8 kg/m2 Postmenopausal  
 Smoking Status Never Smoker Vitals History BMI and BSA Data Body Mass Index Body Surface Area  
 21.8 kg/m 2 1.54 m 2 Preferred Pharmacy Pharmacy Name Phone [address] [facility] [address] [phone] Your Updated Medication List  
  
 This list is accurate as of 8/27/18  1:53 PM.  Always use your most recent med list.  
  
  
  
  
 aspirin 81 mg tablet Take 81 mg by mouth daily. atenolol 25 mg tablet Commonly known as:  TENORMIN Take 25 mg by mouth daily. cabergoline 0.5 mg tablet Commonly known as:  DOSTINEX  
TAKE 1 TABLET TWICE WEEKLY  
  
 CALCIUM 600 + D(3) PO Take  by mouth daily. desmopressin 10 mcg/spray (0.1 mL) solution Commonly known as:  DDAVP  
1 Spray by Left Nostril route as directed. Every night  
  
 hydrocortisone 5 mg tablet Commonly known as:  CORTEF  
TAKE 2 TABLETS EVERY MORNING AND 1 TABLET AT 4 TO 5 PM  
  
 levothyroxine 75 mcg tablet Commonly known as:  SYNTHROID  
TAKE 1 TABLET EVERY DAY IN THE MORNING  BEFORE  BREAKFAST  
  
 TYLENOL EXTRA STRENGTH 500 mg tablet Generic drug:  acetaminophen Take  by mouth as needed for Pain. VITAMIN D3 2,000 unit Tab Generic drug:  cholecalciferol (vitamin D3) Take  by mouth daily. Follow-up Instructions Return in about 1 year (around 8/27/2019). To-Do List   
 08/10/2019 Lab:  METABOLIC PANEL, BASIC   
  
 08/10/2019 Lab:  PROLACTIN   
  
 08/10/2019 Lab:  T4, FREE   
  
 08/10/2019 Lab:  VITAMIN D, 25 HYDROXY Patient Instructions 1) I will track down Dr. Price Amend notes and scans to see if there is a reason why he has increased the frequency of scans over the past year. 2) Your free T4 (thyroid test) is perfect so I will keep your dose the same of levothyroxine. 3) Your prolactin remains appropriately low. 4) BUN and creatinine are markers of kidney function. Your values are normal. 
 
5) Your electrolytes (sodium, potassium, and calcium) are all normal.  Your glucose (blood sugar) is normal. 
 
6) We will arrange a repeat bone density scan prior to your visit next year. Introducing [facility]! [employer] introduces [employer] patient portal. Now you can access parts of your medical record, email your doctor's office, and request medication refills online. 1. In your internet browser, go to [email or web address] 2. Click on the First Time User? Click Here link in the Sign In box. You will see the New Member Sign Up page. 3. Enter your [employer] Access Code exactly as it appears below. You will not need to use this code after youve completed the sign-up process. If you do not sign up before the expiration date, you must request a new code. · [employer] Access Code: [email or web address] Expires: 9/18/2018 11:45 AM 
 
4. Enter the last four digits of your Social Security Number (xxxx) and Date of Birth (mm/dd/yyyy) as indicated and click Submit. You will be taken to the next sign-up page. 5. Create a [employer] ID. This will be your [employer] login ID and cannot be changed, so think of one that is secure and easy to remember. 6. Create a [employer] password. You can change your password at any time. 7. Enter your Password Reset Question and Answer. This can be used at a later time if you forget your password. 8. Enter your e-mail address. You will receive e-mail notification when new information is available in [address] 9. Click Sign Up. You can now view and download portions of your medical record. 10. Click the Download Summary menu link to download a portable copy of your medical information. If you have questions, please visit the Frequently Asked Questions section of the [employer] website. Remember, [employer] is NOT to be used for urgent needs. For medical emergencies, dial 911. Now available from your iPhone and Android! Please provide this summary of care documentation to your next provider. Your primary care clinician is listed as KALANI Motley 47. If you have any questions after today's visit, please call [phone].

## 2018-08-27 NOTE — PROGRESS NOTES
Chief Complaint   Patient presents with    Other     pituitary f/u    Other     pcp and pharmacy confirmed     History of Present Illness: Kiersten Alaniz is a 69 y.o. female here for follow up of thyroid. Weight up 6 lbs since last visit in 8/17. No major change to her health over the last year. Bowels are regular. No heat or cold intolerance. No hair loss or brittle nails. Overall energy is about the same. Still wearing medic alert bracelet. No dizziness with standing, headaches, abd pain, n/v. Apparently had an MRI last summer and then a CT scan in 1/18 and another MRI in 7/18 and was told she would benefit from another CT scan this fall. I haven't received any of these reports from Dr. Naomi Cid so I'll track them down. No change in vision on recent eye exam 2 weeks ago. No increased thirst or urination and still 1 spray of DDAVP at night. Hasn't been doing as much walking this summer due to the heat but prior to this was trying to walk a few times a week for 30 minutes total broken up throughout the day. No falls or fractures. Still takes her LT4 at bedtime. Compliant with atenolol home BP reading was 127/67 2 days ago. Current Outpatient Prescriptions   Medication Sig    cabergoline (DOSTINEX) 0.5 mg tablet TAKE 1 TABLET TWICE WEEKLY    levothyroxine (SYNTHROID) 75 mcg tablet TAKE 1 TABLET EVERY DAY IN THE MORNING  BEFORE  BREAKFAST    desmopressin (DDAVP) 10 mcg/spray (0.1 mL) solution 1 Spray by Left Nostril route as directed. Every night    hydrocortisone (CORTEF) 5 mg tablet TAKE 2 TABLETS EVERY MORNING AND 1 TABLET AT 4 TO 5 PM    acetaminophen (TYLENOL EXTRA STRENGTH) 500 mg tablet Take  by mouth as needed for Pain.  cholecalciferol, vitamin D3, (VITAMIN D3) 2,000 unit Tab Take  by mouth daily.  CALCIUM CARBONATE/VITAMIN D3 (CALCIUM 600 + D,3, PO) Take  by mouth daily.  atenolol (TENORMIN) 25 mg tablet Take 25 mg by mouth daily.       aspirin 81 mg tablet Take 81 mg by mouth daily. No current facility-administered medications for this visit. Allergies   Allergen Reactions    Amoxicillin Hives     Review of Systems:  - Cardiovascular: no chest pain  - Neurological: no tremors  - Integumentary: skin is normal    Physical Examination:  Blood pressure 154/84, pulse 72, height 5' 2\" (1.575 m), weight 119 lb 3.2 oz (54.1 kg). - General: pleasant, no distress, good eye contact   - Neck: no carotid bruits  - Cardiovascular: regular, normal rate, nl s1 and s2, no m/r/g   - Respiratory: clear to auscultation bilaterally   - Integumentary: skin is normal, no edema  - Neurological: reflexes 2+ at biceps, no tremors  - Psychiatric: normal mood and affect    Data Reviewed:   Component      Latest Ref Rng & Units 8/22/2018 8/22/2018 8/22/2018 8/22/2018           2:08 PM  2:08 PM  2:08 PM  2:08 PM   Glucose      65 - 99 mg/dL   96    BUN      8 - 27 mg/dL   21    Creatinine      0.57 - 1.00 mg/dL   0.79    GFR est non-AA      >59 mL/min/1.73   73    GFR est AA      >59 mL/min/1.73   84    BUN/Creatinine ratio      12 - 28   27    Sodium      134 - 144 mmol/L   137    Potassium      3.5 - 5.2 mmol/L   4.7    Chloride      96 - 106 mmol/L   98    CO2      20 - 29 mmol/L   23    Calcium      8.7 - 10.3 mg/dL   9.5    TSH      0.450 - 4.500 uIU/mL    0.010 (L)   Prolactin      4.8 - 23.3 ng/mL  <0.1 (L)     T4, Free      0.82 - 1.77 ng/dL 1.30          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 Barak Ying. She had visual field disturbance prior to surgery but per pt, this was normal in September 2012 with Dr. Cisco Pathak. She has had repeat MRI with Dr. Barak Ying in September 2012 and the cyst is still present. She did have a prolactin of 52.8 prior to surgery and repeat was still elevated at 53 in March. Her level was higher at 78 in April and 71 in July and 80 in October.   I discussed with Dr. Barak Ying about a trial of cabergoline 0.5 mg 2x/week and this was started in October and her prolactin level was <.0.1 in Jan 2013. I decreased her dose to 1/2 tab 2x/week. I spoke with Dr. Marianna Case at that time and we agreed to hold on repeating an MRI until another 3 months of treatment to see if we can keep her prolactin level in the normal or below normal range to hopefully shrink any residual tissue. In April 2013, her level was still undetectable so she went for repeat MRI in 5/13 that showed the volume of her pituitary mass had decreased from 1.3x1.4x0.9 cm = 1.63 cubic centimers to 0.9x1.16x0.8 cm= 0.84 cubic centimeters which is about a 50% decrease in volume. However in 6/14 her MRI showed her cyst to be 0.9x1. 3x0.9 cm = 1.05 cubic centimeters and even though the report said this was stable, given this was a slight increase from the prior year I decided to increase her dose back to a whole tablet 2x/week to try and further shrink her cyst.  It remained at 0.9x1. 3x0.9 cm in 8/15 and plan per Dr. Marianna Case was for repeat MRI in 2 years and apparently had this done in fall of 2017 and July 2018 and also had a CT scan in 1/18 so I'll track down the reports to find out why the frequency of scanning has been increased. Her prolactin has remained undetectable since 1/13.    - cont cabergoline whole 0.5 mg tab 2x/week   - check prolactin prior to next visit  - f/u with Dr. Lilliana Mclean for visual field testing as needed in the future (last done in 8/18 and was normal)       2. 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 When she tried to taper to 1 spray every other night, had increase in her urine output and thirst so likely this is permanent and will have her stay at 1 spray at bedtime. Her sodium level has been normal on this dose. - check bmp prior to next visit  - cont DDAVP 1 spray at bedtime every night      3.  Secondary hypothyroidism: was started on Synthroid 50 mcg daily based on a low total T4 of 3.1 with an inappropriately normal TSH of 0.777 in January 2012 prior to her surgery. I wasn't sure she truly had hypothyroidism that would necessitate life long treatment. Her repeat labs in March on this dose showed a low TSH of 0.097 with a normal free T4 of 1.07. I had her stop the dose in March and off treatment she didn't feel much different but her free T4 was slightly low at 0.8 and her TSH was normal at 1.43 in April. Given that she was not symptomatic, I held on restarting treatment but her free T4 was lower at 0.75 in July 2012 and she was having more fatigue so I restarted this and she has felt better. Free T4 normal at 1.07 in Jan 2013 with a low TSH of 0.038. I will just follow the free T4 as the TSH is likely to be low in someone with a history of pituitary surgery and repeat free T4 was 1.0 in April 2013 and 1.08 in 8/13 and 1.1 in 2/14. Down to 1.04 in 8/14 and given she had some fatigue and weight gain, I increased her dose to 75 mcg daily to see if this helps and it has and free T4 was 1.33 in 2/15 and 1.43 in 8/15 and 1.29 in 8/16 and 1.53 in 8/17 and 1.3 in 8/18  - cont levothyroxine 75 mcg daily  - check free T4 prior to next visit      4. Adrenal insufficiency: She was started on hydrocortisone 20 mg in am and 10 mg in pm after surgery. Her electrolytes have been fine so I had hoped she would not need long term steroids, especially given her history of osteoporosis. I decreased her to 15/5 in March but her morning cortisol was 2.8 in April after holding her pm dose. Her level was up to 6.6 in July so I cut back to 10/5 but her morning cortisol was 1.1 in October 2012 so she will definitely need this long term. Will not continue to check cortisol levels in the future. Advised her to get a medic alert bracelet and she has done this.   Previously advised her to double her dose of HC in times of stress or infection in the future and again reminded her today  - cont HC 10 mg in am and 5 mg in pm  - wear medic alert bracelet      5. Osteoporosis:  Her vitamin D level was slightly low at 24 in April 2012 and after taking 2000 units/day it was up to 35.6 in July and 31.7 in October and 33.5 in 4/13 and 40 in 2/14 and 50 in 2/15 and 40 in 8/15 and 36.3 in 8/16 and 48 in 8/17. She had a DXA in 11/10 that showed a T-score of -3.0 at the L-spine, 0.0 at the wrist and -2.2 at the left femoral neck. She had been on treatment for about 10 years with one break of undetermined duration. I repeated a scan in May 2013. This showed a significant 13.5% increase in total hip compared to 2010 so I stopped the fosamax at that time and repeated a bone density scan in 7/15 which showed a 3.7% decrease at her right total hip and 1.4% decrease at her left total hip but both locations are in the osteopenic range. Her spine cannot be accurately assessed due to scoliosis so do not feel the 3.6% decrease as listed above is accurate. We decided to hold on any new meds at this time and simply repeat her DXA in 2 years. This showed her spine had a 1.8% decrease and her hip had a 2.1% decrease but both were not significant and we agreed to hold on any treatment for another 2 years and focus on increasing her weight bearing activity through walking. Given her calcium level was 10.4 in 2/15, I cut back on calcium to once daily and level has been normal since. - cont calcium once daily  - check Vitamin D 25-OH level in 2019  - cont vitamin D 2000 units daily   - order repeat DXA in Aug 2019          Patient Instructions   1) I will track down Dr. Rosibel Hwang notes and scans to see if there is a reason why he has increased the frequency of scans over the past year. 2) Your free T4 (thyroid test) is perfect so I will keep your dose the same of levothyroxine. 3) Your prolactin remains appropriately low. 4) BUN and creatinine are markers of kidney function.   Your values are normal.    5) Your electrolytes (sodium, potassium, and calcium) are all normal.  Your glucose (blood sugar) is normal.    6) We will arrange a repeat bone density scan prior to your visit next year. Follow-up Disposition:  Return in about 1 year (around 8/27/2019). Copy sent to:  Dr. Gavin Anders [phone]  Dr. Estle Hodgkins (fax #[phone].)       Addendum: 9/4/18  Was able to obtain the MRI from 10/24/17 along with Dr. Gray Green office note from Jan 2018. The MRI comments on a slight increase in size of her pituitary mass from [id number] mm in 8/15 to [id number] and increasing nodular enhancement along the superior margin abutting the optic chiasm measuring [id number]. Dr. Gray Green note from Jan 2018 states that he reviewed the studies (which I'm assuming must be the MRI from 10/17 and CT scan from 1/18) with Dr. Lluvia Pearson and he does not believe there has been any major change. His plan comments on her having another MRI in 6 months, which she did have in July 2018 but I don't have a copy of this report. I spoke to pt tonight and let her know I reviewed the data that was sent from Dr. Gray Green office and from what I can gather, the reason for the increased frequency of scans is because of the small change in size of the pituitary mass from 2015 to 2017. She said she is due for a repeat CT scan at the end of this month and is supposed to make a f/u appt with Dr. Gavin Sena to discuss the results but she has yet to make this appointment. I told her to go ahead and make the appointment and be sure to ask him to fax me a copy of his plan so I can see if anything else needs to be done and she told me she would.

## 2018-08-27 NOTE — PATIENT INSTRUCTIONS
1) I will track down Dr. Gray Green notes and scans to see if there is a reason why he has increased the frequency of scans over the past year. 2) Your free T4 (thyroid test) is perfect so I will keep your dose the same of levothyroxine. 3) Your prolactin remains appropriately low. 4) BUN and creatinine are markers of kidney function. Your values are normal.    5) Your electrolytes (sodium, potassium, and calcium) are all normal.  Your glucose (blood sugar) is normal.    6) We will arrange a repeat bone density scan prior to your visit next year.

## 2018-08-28 RX ORDER — HYDROCORTISONE 5 MG/1
TABLET ORAL
Qty: 270 TAB | Refills: 3 | Status: SHIPPED | OUTPATIENT
Start: 2018-08-28 | End: 2019-06-17 | Stop reason: SDUPTHER

## 2018-12-06 RX ORDER — LEVOTHYROXINE SODIUM 75 UG/1
TABLET ORAL
Qty: 90 TAB | Refills: 3 | Status: SHIPPED | OUTPATIENT
Start: 2018-12-06 | End: 2019-09-26 | Stop reason: SDUPTHER

## 2019-02-19 RX ORDER — DESMOPRESSIN ACETATE 0.1 MG/ML
SOLUTION NASAL
Qty: 10 ML | Refills: 11 | Status: SHIPPED | OUTPATIENT
Start: 2019-02-19 | End: 2020-03-18

## 2019-03-27 RX ORDER — CABERGOLINE 0.5 MG/1
TABLET ORAL
Qty: 24 TAB | Refills: 3 | Status: SHIPPED | OUTPATIENT
Start: 2019-03-27 | End: 2020-01-15

## 2019-06-17 RX ORDER — HYDROCORTISONE 5 MG/1
TABLET ORAL
Qty: 270 TAB | Refills: 3 | Status: SHIPPED | OUTPATIENT
Start: 2019-06-17 | End: 2021-01-15 | Stop reason: SDUPTHER

## 2019-07-25 DIAGNOSIS — M81.0 AGE-RELATED OSTEOPOROSIS WITHOUT CURRENT PATHOLOGICAL FRACTURE: Primary | ICD-10-CM

## 2019-08-10 DIAGNOSIS — M81.0 AGE-RELATED OSTEOPOROSIS WITHOUT CURRENT PATHOLOGICAL FRACTURE: ICD-10-CM

## 2019-08-10 DIAGNOSIS — E23.2 DIABETES INSIPIDUS (HCC): ICD-10-CM

## 2019-08-10 DIAGNOSIS — E27.40 ADRENAL INSUFFICIENCY (HCC): ICD-10-CM

## 2019-08-10 DIAGNOSIS — E03.8 SECONDARY HYPOTHYROIDISM: ICD-10-CM

## 2019-08-10 DIAGNOSIS — E23.6 PITUITARY CYST (HCC): ICD-10-CM

## 2019-08-12 ENCOUNTER — HOSPITAL ENCOUNTER (OUTPATIENT)
Dept: BONE DENSITY | Age: 79
Discharge: HOME | End: 2019-08-12
Attending: INTERNAL MEDICINE
Payer: MEDICARE

## 2019-08-12 DIAGNOSIS — M81.0 AGE-RELATED OSTEOPOROSIS WITHOUT CURRENT PATHOLOGICAL FRACTURE: ICD-10-CM

## 2019-08-12 PROCEDURE — 77080 DXA BONE DENSITY AXIAL: CPT

## 2019-08-22 LAB
25(OH)D3+25(OH)D2 SERPL-MCNC: 39.8 NG/ML (ref 30–100)
BUN SERPL-MCNC: 19 MG/DL (ref 8–27)
BUN/CREAT SERPL: 23 (ref 12–28)
CALCIUM SERPL-MCNC: 10 MG/DL (ref 8.7–10.3)
CHLORIDE SERPL-SCNC: 103 MMOL/L (ref 96–106)
CO2 SERPL-SCNC: 26 MMOL/L (ref 20–29)
CREAT SERPL-MCNC: 0.81 MG/DL (ref 0.57–1)
GLUCOSE SERPL-MCNC: 99 MG/DL (ref 65–99)
POTASSIUM SERPL-SCNC: 4.1 MMOL/L (ref 3.5–5.2)
PROLACTIN SERPL-MCNC: <0.1 NG/ML (ref 4.8–23.3)
SODIUM SERPL-SCNC: 141 MMOL/L (ref 134–144)
T4 FREE SERPL-MCNC: 1.49 NG/DL (ref 0.82–1.77)

## 2019-08-26 ENCOUNTER — OFFICE VISIT (OUTPATIENT)
Dept: ENDOCRINOLOGY | Age: 79
End: 2019-08-26

## 2019-08-26 VITALS
WEIGHT: 120.4 LBS | SYSTOLIC BLOOD PRESSURE: 183 MMHG | HEIGHT: 62 IN | HEART RATE: 63 BPM | BODY MASS INDEX: 22.16 KG/M2 | DIASTOLIC BLOOD PRESSURE: 68 MMHG

## 2019-08-26 DIAGNOSIS — E55.9 VITAMIN D DEFICIENCY: ICD-10-CM

## 2019-08-26 DIAGNOSIS — E27.40 ADRENAL INSUFFICIENCY (HCC): ICD-10-CM

## 2019-08-26 DIAGNOSIS — E23.6 PITUITARY CYST (HCC): Primary | ICD-10-CM

## 2019-08-26 DIAGNOSIS — E03.8 SECONDARY HYPOTHYROIDISM: ICD-10-CM

## 2019-08-26 DIAGNOSIS — M81.0 AGE-RELATED OSTEOPOROSIS WITHOUT CURRENT PATHOLOGICAL FRACTURE: ICD-10-CM

## 2019-08-26 DIAGNOSIS — E23.2 DIABETES INSIPIDUS (HCC): ICD-10-CM

## 2019-08-26 NOTE — PATIENT INSTRUCTIONS
1) Your bone density has decreased over the past 2 years and your 10 year risk of fracture is 27% for any site other than the hip (over 20% we should consider treatment) and 11% for hip fractures (over 3% we should consider treatment). 2) Please review the patient information on Reclast (once a year IV infusion) and Prolia (once every 6 months injection under the skin. When you have done your research and made a decision about which you may want to pursue, please call the office and we can move forward with setting up your treatment. Reclast is a once a year IV infusion to prevent fracture. This is given at the infusion center and involves having an IV placed and receiving an infusion over about 15 minutes. Side effects are a possible flu-like reaction with low grade fevers and chills and body aches over the 24-48 hours after the infusion which can occur in 5-10% of patients. If this occurs, you can take Tylenol or motrin for these symptoms. Prolia is a once every 6 month under the skin injection). Side effects to watch for are local site reaction of itching or swelling and also symptoms low calcium (cramping/tingling in the hands/feet--these shouldn't occur as your vitamin D and calcium levels are normal). Whatever option we decide on, we would do for 2 years and then repeat a bone density scan in August 2021 to see the effect of the medication. 3) Your calcium and vitamin D are normal.    4) Your prolactin is appropriately low. 5) Your free T4 (thyroid test) is normal and at goal of 1.2-1.6.

## 2019-08-26 NOTE — PROGRESS NOTES
Chief Complaint   Patient presents with    Thyroid Problem     pcp and pharmacy confirmed     History of Present Illness: Geri Elkins is a 69 y.o. female here for follow up of thyroid. Weight up 1 lbs since last visit in 8/18. No major change to health since last visit. After her last visit with Dr. Keeley Shepard in 10/18 he reviewed all of her scans and these did not show any significant change and he reassure her that she could come back in 1 year and is due to see him this fall in November. Will be seeing Dr. Gabriela Quiros this fall too. No new headaches or change in vision. Still taking cabergoline 1 tab 2x/week. No n/v or dizziness. Still wearing the medic alert bracelet and taking the HC 2 tabs in am and 1 tab in pm.  No new abd pain. Still applying 1 spray of DDAVP at night and no increase in thirst or urination. Takes the levothyroxine at bedtime. Bowels are regular. No change in skin, hair, or nails. May have some slight increase in fatigue. No heat or cold intolerance. Still taking calcium daily and vitamin D 2000 units daily. No falls or fractures. She is willing to consider both reclast and prolia but wants to think about these and get back to me. Current Outpatient Medications   Medication Sig    hydrocortisone (CORTEF) 5 mg tablet TAKE 2 TABLETS EVERY MORNING AND 1 TABLET AT 4PM TO 5 PM    cabergoline (DOSTINEX) 0.5 mg tablet TAKE 1 TABLET TWICE WEEKLY    desmopressin (DDAVP NASAL) 10 mcg/spray (0.1 mL) solution USE 1 SPRAY IN LEFT NOSTRIL AS DIRECTED EVERY NIGHT    levothyroxine (SYNTHROID) 75 mcg tablet TAKE 1 TABLET EVERY DAY IN THE MORNING  BEFORE  BREAKFAST    acetaminophen (TYLENOL EXTRA STRENGTH) 500 mg tablet Take  by mouth as needed for Pain.  cholecalciferol, vitamin D3, (VITAMIN D3) 2,000 unit Tab Take  by mouth daily.  CALCIUM CARBONATE/VITAMIN D3 (CALCIUM 600 + D,3, PO) Take  by mouth daily.  atenolol (TENORMIN) 25 mg tablet Take 25 mg by mouth daily.       aspirin 81 mg tablet Take 81 mg by mouth daily. No current facility-administered medications for this visit. Allergies   Allergen Reactions    Amoxicillin Hives     Review of Systems:  - Cardiovascular: no chest pain  - Neurological: no tremors  - Integumentary: skin is normal    Physical Examination:  Blood pressure 183/68, pulse 63, height 5' 2\" (1.575 m), weight 120 lb 6.4 oz (54.6 kg). - General: pleasant, no distress, good eye contact   - Neck: no carotid bruits  - Cardiovascular: regular, normal rate, nl s1 and s2, no m/r/g   - Respiratory: clear to auscultation bilaterally   - Integumentary: skin is normal, no edema  - Neurological: reflexes 2+ at biceps, no tremors  - Psychiatric: normal mood and affect    Data Reviewed:   Component      Latest Ref Rng & Units 8/21/2019 8/21/2019 8/21/2019 8/21/2019          10:46 AM 10:46 AM 10:46 AM 10:46 AM   Glucose      65 - 99 mg/dL  99     BUN      8 - 27 mg/dL  19     Creatinine      0.57 - 1.00 mg/dL  0.81     GFR est non-AA      >59 mL/min/1.73  70     GFR est AA      >59 mL/min/1.73  81     BUN/Creatinine ratio      12 - 28  23     Sodium      134 - 144 mmol/L  141     Potassium      3.5 - 5.2 mmol/L  4.1     Chloride      96 - 106 mmol/L  103     CO2      20 - 29 mmol/L  26     Calcium      8.7 - 10.3 mg/dL  10.0     VITAMIN D, 25-HYDROXY      30.0 - 100.0 ng/mL    39.8   T4, Free      0.82 - 1.77 ng/dL   1.49    Prolactin      4.8 - 23.3 ng/mL <0.1 (L)        Bone Mineral Density     Indication: evaluate for interval change  Age: 69  Sex: Female.     Menopause status: Postmenopausal.  Hormone replacement therapy: No      Number of falls in the past year: None. Risk factors for osteoporosis: Chronic steroid use       Current medication for osteoporosis: None. Comparison: 8/10/2017 performed on a [employer] unit     Technique: Imaging was performed on the [employer].  World Health Organization  meta-analysis fracture risk calculator (FRAX) analysis was performed for 10 year  fracture risk probability assessment     Excluded sites: None      Findings:     Fractures identified on Lateral scanogram: None but limited by scoliosis and  spondylosis. Femoral Neck Left:  Bone mineral density (gm/cm2): 0.669  % of peak bone mass: 64  % for age matched controls: 80  T-score: -2.7  Z-score: -0.4     Femoral Neck Right:  Bone mineral density (gm/cm2): 0.735  % of peak bone mass: 71  % for age matched controls:  102  T-score: -2.2  Z-score: 0.1     Total Hip Left:  Bone mineral density (gm/cm2): 0.680  % of peak bone mass: 67  % for age matched controls: 80  T-score: -2.6  Z-score: -0.5     Total Hip Right:  Bone mineral density (gm/cm2): 0.725  % of peak bone mass: 72  % for age matched controls:  80  T-score: -2.2  Z-score: -0.1     Lumbar Spine: L1-L4  Bone mineral density (gm/cm2): 0.913  % of peak bone mass: 77  % for age matched controls: 80  T-score: -2.3  Z-score: -0.1     33% Radius Left:  Bone mineral density (gm/cm2): 0.509  % of peak bone mass: 58  % for age matched controls:  62  T-score: -4.2  Z-score: -1.7     IMPRESSION  Impression: This patient is osteoporotic using the World Health Organization criteria  Compared to the prior study, bone mineral density measurements are not  comparable as the patient was imaged on different units. 10 year probability of major osteoporotic fracture: 27.1%  10 year probability of hip fracture: 11.5%     Recommendations:  Therapy recommendations need to be tailored to each individual patient. Using  the [facility]) FRAX absolute fracture algorithm, the  [facility] recommends beginning pharmacological therapy in  postmenopausal women and men over the age of 48 with a 8 year probability of a  hip fracture of >3% OR with the 10 year probability of a major osteoporotic  fracture of >20%. Please reconsider testing based on risk factors.  Currently, Medicare will only  reimburse for a central DXA examination every two years, unless the patient is  on chronic glucocorticoid therapy.     Note: Please note that reliable, valid comparisons cannot be made between  studies which have been performed on machines from different manufacturers. If  clinically warranted, a follow up study performed at this site, on the same  unit, would allow the most sensitive assessment of change in bone mineral  density. 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 Raffi Sarabia. She had visual field disturbance prior to surgery but per pt, this was normal in September 2012 with Dr. Eileen Lorenzo. She has had repeat MRI with Dr. Raffi Sarabia in September 2012 and the cyst is still present. She did have a prolactin of 52.8 prior to surgery and repeat was still elevated at 53 in March. Her level was higher at 78 in April and 71 in July and 80 in October. I discussed with Dr. Raffi Sarabia about a trial of cabergoline 0.5 mg 2x/week and this was started in October and her prolactin level was <.0.1 in Jan 2013. I decreased her dose to 1/2 tab 2x/week. I spoke with Dr. Raffi Sarabia at that time and we agreed to hold on repeating an MRI until another 3 months of treatment to see if we can keep her prolactin level in the normal or below normal range to hopefully shrink any residual tissue. In April 2013, her level was still undetectable so she went for repeat MRI in 5/13 that showed the volume of her pituitary mass had decreased from 1.3x1.4x0.9 cm = 1.63 cubic centimers to 0.9x1.16x0.8 cm= 0.84 cubic centimeters which is about a 50% decrease in volume. However in 6/14 her MRI showed her cyst to be 0.9x1. 3x0.9 cm = 1.05 cubic centimeters and even though the report said this was stable, given this was a slight increase from the prior year I decided to increase her dose back to a whole tablet 2x/week to try and further shrink her cyst.  It remained at 0.9x1. 3x0.9 cm in 8/15 and plan per Dr. Maureen Casey was for repeat MRI in 2 years and apparently had this done in fall of 2017 and July 2018 and also had a CT scan in 1/18. Dr. Maureen Casey sent me a note in 10/18 that said he reviewed all of her imaging and there has not been any significant change in her size of her cyst and recommended she come back in the fall of 2019. Her prolactin has remained undetectable since 1/13.    - cont cabergoline whole 0.5 mg tab 2x/week   - check prolactin prior to next visit  - f/u with Dr. Batsheva Lni for visual field testing as needed in the future (last done in 8/18 and was normal)       2. 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 When she tried to taper to 1 spray every other night, had increase in her urine output and thirst so likely this is permanent and will have her stay at 1 spray at bedtime. Her sodium level has been normal on this dose. - check bmp prior to next visit  - cont DDAVP 1 spray at bedtime every night      3. Secondary hypothyroidism: was started on Synthroid 50 mcg daily based on a low total T4 of 3.1 with an inappropriately normal TSH of 0.777 in January 2012 prior to her surgery. I wasn't sure she truly had hypothyroidism that would necessitate life long treatment. Her repeat labs in March on this dose showed a low TSH of 0.097 with a normal free T4 of 1.07. I had her stop the dose in March and off treatment she didn't feel much different but her free T4 was slightly low at 0.8 and her TSH was normal at 1.43 in April. Given that she was not symptomatic, I held on restarting treatment but her free T4 was lower at 0.75 in July 2012 and she was having more fatigue so I restarted this and she has felt better. Free T4 normal at 1.07 in Jan 2013 with a low TSH of 0.038.   I will just follow the free T4 as the TSH is likely to be low in someone with a history of pituitary surgery and repeat free T4 was 1.0 in April 2013 and 1.08 in 8/13 and 1.1 in 2/14. Down to 1.04 in 8/14 and given she had some fatigue and weight gain, I increased her dose to 75 mcg daily to see if this helps and it has and free T4 was 1.33 in 2/15 and 1.43 in 8/15 and 1.29 in 8/16 and 1.53 in 8/17 and 1.3 in 8/18 and 1.49 in 8/19  - cont levothyroxine 75 mcg daily  - check free T4 prior to next visit      4. Adrenal insufficiency: She was started on hydrocortisone 20 mg in am and 10 mg in pm after surgery. Her electrolytes have been fine so I had hoped she would not need long term steroids, especially given her history of osteoporosis. I decreased her to 15/5 in March but her morning cortisol was 2.8 in April after holding her pm dose. Her level was up to 6.6 in July so I cut back to 10/5 but her morning cortisol was 1.1 in October 2012 so she will definitely need this long term. Will not continue to check cortisol levels in the future. Advised her to get a medic alert bracelet and she has done this. Previously advised her to double her dose of HC in times of stress or infection in the future and again reminded her today  - cont HC 10 mg in am and 5 mg in pm  - wear medic alert bracelet      5. Osteoporosis:  Her vitamin D level was slightly low at 24 in April 2012 and after taking 2000 units/day it was up to 35.6 in July and 31.7 in October and 33.5 in 4/13 and 40 in 2/14 and 50 in 2/15 and 40 in 8/15 and 36.3 in 8/16 and 48 in 8/17. She had a DXA in 11/10 that showed a T-score of -3.0 at the L-spine, 0.0 at the wrist and -2.2 at the left femoral neck. She had been on treatment for about 10 years with one break of undetermined duration. I repeated a scan in May 2013.  This showed a significant 13.5% increase in total hip compared to 2010 so I stopped the fosamax at that time and repeated a bone density scan in 7/15 which showed a 3.7% decrease at her right total hip and 1.4% decrease at her left total hip but both locations are in the osteopenic range. Her spine cannot be accurately assessed due to scoliosis so do not feel the 3.6% decrease as listed above is accurate. We decided to hold on any new meds at this time and simply repeat her DXA in 2 years. This showed her spine had a 1.8% decrease and her hip had a 2.1% decrease but both were not significant and we agreed to hold on any treatment for another 2 years and focus on increasing her weight bearing activity through walking. Repeat DXA in 8/19 was done on a different machine so a reliable comparison cannot be made but her T-score is worse at her left hip and wrist and FRAX score is 27% for MOP fracture and 11% for hip fracture which are both above treatment threshold so I recommend either prolia or reclast and she wants to think about these and will be in touch with what she want to do. Given her calcium level was 10.4 in 2/15, I cut back on calcium to once daily and level has been normal since. - cont calcium once daily  - check Vitamin D 25-OH level prior to next visit (DX E55.9)  - cont vitamin D 2000 units daily   - order repeat DXA in Aug 2021  - begin reclast or prolia          We spent 40 minutes of face to face time together and > 50% of the time was spent in counseling regarding management of all the conditions above. Patient Instructions   1) Your bone density has decreased over the past 2 years and your 10 year risk of fracture is 27% for any site other than the hip (over 20% we should consider treatment) and 11% for hip fractures (over 3% we should consider treatment). 2) Please review the patient information on Reclast (once a year IV infusion) and Prolia (once every 6 months injection under the skin. When you have done your research and made a decision about which you may want to pursue, please call the office and we can move forward with setting up your treatment. Reclast is a once a year IV infusion to prevent fracture.   This is given at the infusion [facility] and involves having an IV placed and receiving an infusion over about 15 minutes. Side effects are a possible flu-like reaction with low grade fevers and chills and body aches over the 24-48 hours after the infusion which can occur in 5-10% of patients. If this occurs, you can take Tylenol or motrin for these symptoms. Prolia is a once every 6 month under the skin injection). Side effects to watch for are local site reaction of itching or swelling and also symptoms low calcium (cramping/tingling in the hands/feet--these shouldn't occur as your vitamin D and calcium levels are normal). Whatever option we decide on, we would do for 2 years and then repeat a bone density scan in August 2021 to see the effect of the medication. 3) Your calcium and vitamin D are normal.    4) Your prolactin is appropriately low. 5) Your free T4 (thyroid test) is normal and at goal of 1.2-1.6. Follow-up and Dispositions    · Return in about 1 year (around 8/26/2020).                Copy sent to:  Dr. Sagrario Biswas

## 2019-09-26 RX ORDER — LEVOTHYROXINE SODIUM 75 UG/1
TABLET ORAL
Qty: 90 TAB | Refills: 3 | Status: SHIPPED | OUTPATIENT
Start: 2019-09-26 | End: 2020-10-24

## 2020-01-14 ENCOUNTER — HOSPITAL ENCOUNTER (EMERGENCY)
Age: 80
Discharge: HOME | End: 2020-01-14
Attending: EMERGENCY MEDICINE
Payer: MEDICARE

## 2020-01-14 VITALS
HEIGHT: 62 IN | HEART RATE: 64 BPM | SYSTOLIC BLOOD PRESSURE: 181 MMHG | OXYGEN SATURATION: 100 % | WEIGHT: 116.62 LBS | BODY MASS INDEX: 21.46 KG/M2 | TEMPERATURE: 97.7 F | DIASTOLIC BLOOD PRESSURE: 74 MMHG | RESPIRATION RATE: 13 BRPM

## 2020-01-14 DIAGNOSIS — R00.2 PALPITATIONS: Primary | ICD-10-CM

## 2020-01-14 DIAGNOSIS — I48.0 PAF (PAROXYSMAL ATRIAL FIBRILLATION) (HCC): ICD-10-CM

## 2020-01-14 LAB
ALBUMIN SERPL-MCNC: 4.3 G/DL (ref 3.5–5)
ALBUMIN/GLOB SERPL: 1.2 {RATIO} (ref 1.1–2.2)
ALP SERPL-CCNC: 95 U/L (ref 45–117)
ALT SERPL-CCNC: 24 U/L (ref 12–78)
ANION GAP SERPL CALC-SCNC: 6 MMOL/L (ref 5–15)
AST SERPL-CCNC: 18 U/L (ref 15–37)
BASOPHILS # BLD: 0 K/UL (ref 0–0.1)
BASOPHILS NFR BLD: 1 % (ref 0–1)
BILIRUB SERPL-MCNC: 0.3 MG/DL (ref 0.2–1)
BUN SERPL-MCNC: 17 MG/DL (ref 6–20)
BUN/CREAT SERPL: 19 (ref 12–20)
CALCIUM SERPL-MCNC: 9 MG/DL (ref 8.5–10.1)
CHLORIDE SERPL-SCNC: 105 MMOL/L (ref 97–108)
CO2 SERPL-SCNC: 28 MMOL/L (ref 21–32)
CREAT SERPL-MCNC: 0.89 MG/DL (ref 0.55–1.02)
DIFFERENTIAL METHOD BLD: ABNORMAL
EOSINOPHIL # BLD: 0.1 K/UL (ref 0–0.4)
EOSINOPHIL NFR BLD: 1 % (ref 0–7)
ERYTHROCYTE [DISTWIDTH] IN BLOOD BY AUTOMATED COUNT: 12.4 % (ref 11.5–14.5)
GLOBULIN SER CALC-MCNC: 3.7 G/DL (ref 2–4)
GLUCOSE SERPL-MCNC: 143 MG/DL (ref 65–100)
HCT VFR BLD AUTO: 38.3 % (ref 35–47)
HGB BLD-MCNC: 12.8 G/DL (ref 11.5–16)
IMM GRANULOCYTES # BLD AUTO: 0 K/UL (ref 0–0.04)
IMM GRANULOCYTES NFR BLD AUTO: 0 % (ref 0–0.5)
LYMPHOCYTES # BLD: 2.4 K/UL (ref 0.8–3.5)
LYMPHOCYTES NFR BLD: 37 % (ref 12–49)
MAGNESIUM SERPL-MCNC: 2.2 MG/DL (ref 1.6–2.4)
MCH RBC QN AUTO: 30.8 PG (ref 26–34)
MCHC RBC AUTO-ENTMCNC: 33.4 G/DL (ref 30–36.5)
MCV RBC AUTO: 92.1 FL (ref 80–99)
MONOCYTES # BLD: 0.6 K/UL (ref 0–1)
MONOCYTES NFR BLD: 9 % (ref 5–13)
NEUTS SEG # BLD: 3.4 K/UL (ref 1.8–8)
NEUTS SEG NFR BLD: 52 % (ref 32–75)
NRBC # BLD: 0 K/UL (ref 0–0.01)
NRBC BLD-RTO: 0 PER 100 WBC
PLATELET # BLD AUTO: 251 K/UL (ref 150–400)
PMV BLD AUTO: 8.8 FL (ref 8.9–12.9)
POTASSIUM SERPL-SCNC: 3.4 MMOL/L (ref 3.5–5.1)
PROT SERPL-MCNC: 8 G/DL (ref 6.4–8.2)
RBC # BLD AUTO: 4.16 M/UL (ref 3.8–5.2)
SODIUM SERPL-SCNC: 139 MMOL/L (ref 136–145)
TROPONIN I SERPL-MCNC: <0.05 NG/ML
WBC # BLD AUTO: 6.6 K/UL (ref 3.6–11)

## 2020-01-14 PROCEDURE — 80053 COMPREHEN METABOLIC PANEL: CPT

## 2020-01-14 PROCEDURE — 84484 ASSAY OF TROPONIN QUANT: CPT

## 2020-01-14 PROCEDURE — 83735 ASSAY OF MAGNESIUM: CPT

## 2020-01-14 PROCEDURE — 99284 EMERGENCY DEPT VISIT MOD MDM: CPT

## 2020-01-14 PROCEDURE — 36415 COLL VENOUS BLD VENIPUNCTURE: CPT

## 2020-01-14 PROCEDURE — 85025 COMPLETE CBC W/AUTO DIFF WBC: CPT

## 2020-01-14 PROCEDURE — 93005 ELECTROCARDIOGRAM TRACING: CPT

## 2020-01-15 LAB
ATRIAL RATE: 73 BPM
CALCULATED R AXIS, ECG10: 9 DEGREES
CALCULATED T AXIS, ECG11: 20 DEGREES
DIAGNOSIS, 93000: NORMAL
P-R INTERVAL, ECG05: 224 MS
Q-T INTERVAL, ECG07: 424 MS
QRS DURATION, ECG06: 72 MS
QTC CALCULATION (BEZET), ECG08: 467 MS
VENTRICULAR RATE, ECG03: 73 BPM

## 2020-01-15 RX ORDER — CABERGOLINE 0.5 MG/1
TABLET ORAL
Qty: 24 TAB | Refills: 3 | Status: SHIPPED | OUTPATIENT
Start: 2020-01-15 | End: 2020-10-20

## 2020-01-15 NOTE — ED NOTES
IV lock placed prior to start of my shift removed prior to arrival. Cath tip intact.   IV was a 20gauge right antecubital.

## 2020-01-15 NOTE — ED NOTES
Discharged home with written and verbal discharge instructions. Signature obtained. Home with family. No dizziness, chest pain, or shortness of breath.

## 2020-01-15 NOTE — ED PROVIDER NOTES
EMERGENCY DEPARTMENT HISTORY AND PHYSICAL EXAM      Date: 1/14/2020  Patient Name: Kayode Kirk  Patient Age and Sex: 67 y.o. female    History of Presenting Illness     Chief Complaint   Patient presents with    Palpitations     Patient has a h/o afib follows Dr. Alicja Mcnair, Patient also was recently diagnosed with pulmonary hypertension       History Provided By: Patient, , daughter    Phuc Blair to gather history was limited by: none    HPI: Kayode Kirk, 67 y.o. female with history of hypopituitarism, paroxysmal atrial fibrillation, complains of palpitations and episodes of lightheadedness. She was diagnosed with atrial fibrillation and pulmonary hypertension over the past couple months. She is not anticoagulated. She is managed on daily flecainide. Over the past few weeks, and especially the last 24 hours, she has had frequent episodes of palpitations and lightheadedness, no syncope or chest pain. She states she took her blood pressure at home with a cuff, and the blood pressure was normal but her heart rate was in the 40s. Pt denies any other alleviating or exacerbating factors. There are no other complaints, changes or physical findings at this time.      Past Medical History:   Diagnosis Date    Chronic pain     back    Osteoporosis     in the spine and osteopenia in the hip    Other and unspecified hyperlipidemia     Pituitary cyst ([address]     Tachycardia      Past Surgical History:   Procedure Laterality Date    COLONOSCOPY N/A 8/30/2017    COLONOSCOPY performed by Tesha Chris MD at [address] [place] [address] [facility]ST      HX CATARACT REMOVAL  Nov 2011    left    HX OTHER SURGICAL  2/22/12    pituitary surgery for suprasellar cyst       PCP: Avel Johnson MD    Past History     Past Medical History:  Past Medical History:   Diagnosis Date    Chronic pain     back    Osteoporosis     in the spine and osteopenia in the hip    Other and unspecified hyperlipidemia  Pituitary cyst ([address]     Tachycardia        Past Surgical History:  Past Surgical History:   Procedure Laterality Date    COLONOSCOPY N/A 8/30/2017    COLONOSCOPY performed by Lucero Alcala MD at [facility]      HX CATARACT REMOVAL  Nov 2011    left    HX OTHER SURGICAL  2/22/12    pituitary surgery for suprasellar cyst       Family History:  Family History   Problem Relation Age of Onset    Osteoporosis Mother     Diabetes Maternal Aunt     Diabetes Maternal Uncle     Diabetes Maternal Uncle     Other Neg Hx         pituitary disease    Thyroid Disease Neg Hx        Social History:  Social History     Tobacco Use    Smoking status: Never Smoker    Smokeless tobacco: Never Used   Substance Use Topics    Alcohol use: No    Drug use: No       Allergies: Allergies   Allergen Reactions    Amoxicillin Hives       Current Medications:  No current facility-administered medications on file prior to encounter. Current Outpatient Medications on File Prior to Encounter   Medication Sig Dispense Refill    levothyroxine (SYNTHROID) 75 mcg tablet TAKE 1 TABLET EVERY DAY IN THE MORNING  BEFORE  BREAKFAST 90 Tab 3    hydrocortisone (CORTEF) 5 mg tablet TAKE 2 TABLETS EVERY MORNING AND 1 TABLET AT 4PM TO 5  Tab 3    cabergoline (DOSTINEX) 0.5 mg tablet TAKE 1 TABLET TWICE WEEKLY 24 Tab 3    desmopressin (DDAVP NASAL) 10 mcg/spray (0.1 mL) solution USE 1 SPRAY IN LEFT NOSTRIL AS DIRECTED EVERY NIGHT 10 mL 11    acetaminophen (TYLENOL EXTRA STRENGTH) 500 mg tablet Take  by mouth as needed for Pain.  cholecalciferol, vitamin D3, (VITAMIN D3) 2,000 unit Tab Take  by mouth daily.  CALCIUM CARBONATE/VITAMIN D3 (CALCIUM 600 + D,3, PO) Take  by mouth daily.  atenolol (TENORMIN) 25 mg tablet Take 25 mg by mouth daily.  aspirin 81 mg tablet Take 81 mg by mouth daily.            Review of Systems   Review of Systems   Constitutional: Positive for fatigue. Respiratory: Negative for shortness of breath. Cardiovascular: Positive for palpitations. Neurological: Negative for syncope. All other systems reviewed and are negative. Physical Exam   Vital Signs  Patient Vitals for the past 24 hrs:   Temp Pulse Resp BP SpO2   01/14/20 2000  (!) 58 13 174/70 100 %   01/14/20 1852 97.7 °F (36.5 °C) 75 18 186/73 99 %       Physical Exam  Vitals signs and nursing note reviewed. Constitutional:       General: She is not in acute distress. Appearance: She is well-developed. HENT:      Head: Normocephalic and atraumatic. Mouth/Throat:      Mouth: Mucous membranes are moist.   Eyes:      General:         Right eye: No discharge. Left eye: No discharge. Conjunctiva/sclera: Conjunctivae normal.   Neck:      Musculoskeletal: Normal range of motion and neck supple. Cardiovascular:      Rate and Rhythm: Normal rate and regular rhythm. Heart sounds: Normal heart sounds. No murmur. Pulmonary:      Effort: Pulmonary effort is normal. No respiratory distress. Breath sounds: Normal breath sounds. No wheezing. Abdominal:      General: There is no distension. Palpations: Abdomen is soft. Tenderness: There is no tenderness. Musculoskeletal: Normal range of motion. General: No deformity. Skin:     General: Skin is warm and dry. Findings: No rash. Neurological:      Mental Status: She is alert and oriented to person, place, and time. Psychiatric:         Behavior: Behavior normal.         Thought Content:  Thought content normal.         Diagnostic Study Results   Labs  Recent Results (from the past 24 hour(s))   EKG, 12 LEAD, INITIAL    Collection Time: 01/14/20  7:00 PM   Result Value Ref Range    Ventricular Rate 73 BPM    Atrial Rate 73 BPM    P-R Interval 224 ms    QRS Duration 72 ms    Q-T Interval 424 ms    QTC Calculation (Bezet) 467 ms    Calculated R Axis 9 degrees    Calculated T Axis 20 degrees Diagnosis       Sinus rhythm with 1st degree AV block  Nonspecific ST abnormality  No previous ECGs available     CBC WITH AUTOMATED DIFF    Collection Time: 01/14/20  7:08 PM   Result Value Ref Range    WBC 6.6 3.6 - 11.0 K/uL    RBC 4.16 3.80 - 5.20 M/uL    HGB 12.8 11.5 - 16.0 g/dL    HCT 38.3 35.0 - 47.0 %    MCV 92.1 80.0 - 99.0 FL    MCH 30.8 26.0 - 34.0 PG    MCHC 33.4 30.0 - 36.5 g/dL    RDW 12.4 11.5 - 14.5 %    PLATELET 751 318 - 432 K/uL    MPV 8.8 (L) 8.9 - 12.9 FL    NRBC 0.0 0  WBC    ABSOLUTE NRBC 0.00 0.00 - 0.01 K/uL    NEUTROPHILS 52 32 - 75 %    LYMPHOCYTES 37 12 - 49 %    MONOCYTES 9 5 - 13 %    EOSINOPHILS 1 0 - 7 %    BASOPHILS 1 0 - 1 %    IMMATURE GRANULOCYTES 0 0.0 - 0.5 %    ABS. NEUTROPHILS 3.4 1.8 - 8.0 K/UL    ABS. LYMPHOCYTES 2.4 0.8 - 3.5 K/UL    ABS. MONOCYTES 0.6 0.0 - 1.0 K/UL    ABS. EOSINOPHILS 0.1 0.0 - 0.4 K/UL    ABS. BASOPHILS 0.0 0.0 - 0.1 K/UL    ABS. IMM. GRANS. 0.0 0.00 - 0.04 K/UL    DF AUTOMATED     METABOLIC PANEL, COMPREHENSIVE    Collection Time: 01/14/20  7:08 PM   Result Value Ref Range    Sodium 139 136 - 145 mmol/L    Potassium 3.4 (L) 3.5 - 5.1 mmol/L    Chloride 105 97 - 108 mmol/L    CO2 28 21 - 32 mmol/L    Anion gap 6 5 - 15 mmol/L    Glucose 143 (H) 65 - 100 mg/dL    BUN 17 6 - 20 MG/DL    Creatinine 0.89 0.55 - 1.02 MG/DL    BUN/Creatinine ratio 19 12 - 20      GFR est AA >60 >60 ml/min/1.73m2    GFR est non-AA >60 >60 ml/min/1.73m2    Calcium 9.0 8.5 - 10.1 MG/DL    Bilirubin, total 0.3 0.2 - 1.0 MG/DL    ALT (SGPT) 24 12 - 78 U/L    AST (SGOT) 18 15 - 37 U/L    Alk.  phosphatase 95 45 - 117 U/L    Protein, total 8.0 6.4 - 8.2 g/dL    Albumin 4.3 3.5 - 5.0 g/dL    Globulin 3.7 2.0 - 4.0 g/dL    A-G Ratio 1.2 1.1 - 2.2     MAGNESIUM    Collection Time: 01/14/20  7:08 PM   Result Value Ref Range    Magnesium 2.2 1.6 - 2.4 mg/dL   TROPONIN I    Collection Time: 01/14/20  7:08 PM   Result Value Ref Range    Troponin-I, Qt. <0.05 <0.05 ng/mL Radiologic Studies  No orders to display     CT Results  (Last 48 hours)    None        CXR Results  (Last 48 hours)    None          Procedures   EKG  Date/Time: 1/14/2020 8:26 PM  Performed by: Titus Manning MD  Authorized by: Titus Manning MD     ECG reviewed by ED Physician in the absence of a cardiologist: yes    Interpretation:     Interpretation: non-specific    Rate:     ECG rate assessment: normal    Rhythm:     Rhythm: sinus rhythm    Ectopy:     Ectopy: none    QRS:     QRS axis:  Normal  Conduction:     Conduction: abnormal      Abnormal conduction: 1st degree    ST segments:     ST segments:  Normal  T waves:     T waves: normal          Medical Decision Making     Provider Notes (Medical Decision Making):   80-year-old female with history of paroxysmal atrial fibrillation, complaining of episodes of lightheadedness and near syncope and palpitations over the past few weeks. No chest pain. On exam she is well-appearing, no distress. She is mildly hypertensive. Heart rate is normal in the 70s, and normal sinus rhythm. She has trace rales bilaterally. No peripheral edema. Overall her H&P is very consistent with uncomplicated mild paroxysmal atrial fibrillation, and currently in normal sinus rhythm. Would not recommend any acute changes to her medications at this time. We discussed other potential medications for her including Multaq and options such as ablation. She will continue to discuss these with her cardiologist.  No indication for further serial troponins or other monitoring today. Stable for discharge home, continue all medications as prescribed. No concern for ACS/MI or PE. Jose J Jean Baptiste MD  8:24 PM        Consult required? No      Medications Administered During ED Course:  Medications - No data to display       Diagnosis and Disposition     Disposition:  Discharged    Clinical Impression:   1. Palpitations    2.  PAF (paroxysmal atrial fibrillation) [facility])        Attestation:  I personally performed the services described in this documentation on this date 1/14/2020 for patient Christian Tobar. Ayala Gray MD        I was the first provider for this patient on this visit. To the best of my ability I reviewed relevant prior medical records, electrocardiograms, laboratories, and radiologic studies. The patient's presenting problems were discussed, and the patient was in agreement with the care plan formulated and outlined with them. Ayala Gray MD    Please note that this dictation was completed with Dragon voice recognition software. Quite often unanticipated grammatical, syntax, homophones, and other interpretive errors are inadvertently transcribed by the computer software. Please disregard these errors and excuse any errors that have escaped final proofreading.

## 2020-03-18 RX ORDER — DESMOPRESSIN ACETATE 0.1 MG/ML
SOLUTION NASAL
Qty: 10 ML | Refills: 11 | Status: SHIPPED | OUTPATIENT
Start: 2020-03-18 | End: 2021-04-14

## 2020-08-10 DIAGNOSIS — E27.40 ADRENAL INSUFFICIENCY (HCC): ICD-10-CM

## 2020-08-10 DIAGNOSIS — M81.0 AGE-RELATED OSTEOPOROSIS WITHOUT CURRENT PATHOLOGICAL FRACTURE: ICD-10-CM

## 2020-08-10 DIAGNOSIS — E23.6 PITUITARY CYST (HCC): ICD-10-CM

## 2020-08-10 DIAGNOSIS — E23.2 DIABETES INSIPIDUS (HCC): ICD-10-CM

## 2020-08-10 DIAGNOSIS — E03.8 SECONDARY HYPOTHYROIDISM: ICD-10-CM

## 2020-08-10 DIAGNOSIS — E55.9 VITAMIN D DEFICIENCY: ICD-10-CM

## 2020-08-15 LAB
25(OH)D3+25(OH)D2 SERPL-MCNC: 34.8 NG/ML (ref 30–100)
BUN SERPL-MCNC: 18 MG/DL (ref 8–27)
BUN/CREAT SERPL: 20 (ref 12–28)
CALCIUM SERPL-MCNC: 9.7 MG/DL (ref 8.7–10.3)
CHLORIDE SERPL-SCNC: 102 MMOL/L (ref 96–106)
CO2 SERPL-SCNC: 26 MMOL/L (ref 20–29)
CREAT SERPL-MCNC: 0.89 MG/DL (ref 0.57–1)
GLUCOSE SERPL-MCNC: 90 MG/DL (ref 65–99)
POTASSIUM SERPL-SCNC: 4.1 MMOL/L (ref 3.5–5.2)
PROLACTIN SERPL-MCNC: <0.1 NG/ML (ref 4.8–23.3)
SODIUM SERPL-SCNC: 141 MMOL/L (ref 134–144)
T4 FREE SERPL-MCNC: 1.18 NG/DL (ref 0.82–1.77)

## 2020-08-24 ENCOUNTER — VIRTUAL VISIT (OUTPATIENT)
Dept: ENDOCRINOLOGY | Age: 80
End: 2020-08-24
Payer: MEDICARE

## 2020-08-24 DIAGNOSIS — E03.8 SECONDARY HYPOTHYROIDISM: ICD-10-CM

## 2020-08-24 DIAGNOSIS — M81.0 AGE-RELATED OSTEOPOROSIS WITHOUT CURRENT PATHOLOGICAL FRACTURE: ICD-10-CM

## 2020-08-24 DIAGNOSIS — E23.2 DIABETES INSIPIDUS (HCC): ICD-10-CM

## 2020-08-24 DIAGNOSIS — E27.40 ADRENAL INSUFFICIENCY (HCC): ICD-10-CM

## 2020-08-24 DIAGNOSIS — E55.9 VITAMIN D DEFICIENCY: ICD-10-CM

## 2020-08-24 DIAGNOSIS — E23.6 PITUITARY CYST (HCC): Primary | ICD-10-CM

## 2020-08-24 PROCEDURE — 99214 OFFICE O/P EST MOD 30 MIN: CPT | Performed by: INTERNAL MEDICINE

## 2020-08-24 RX ORDER — FUROSEMIDE 20 MG/1
20 TABLET ORAL DAILY
COMMUNITY
Start: 2020-06-13

## 2020-08-24 RX ORDER — FLECAINIDE ACETATE 50 MG/1
50 TABLET ORAL 2 TIMES DAILY
COMMUNITY
Start: 2020-08-01

## 2020-08-24 NOTE — PATIENT INSTRUCTIONS
1) Your bone density had decreased in 2019 and your 10 year risk of fracture is 27% for any site other than the hip (over 20% we should consider treatment) and 11% for hip fractures (over 3% we should consider treatment). My recommendation is to begin Reclast, which is a once a year IV infusion to prevent fracture. This is given at the infusion center and involves having an IV placed and receiving an infusion over about 15 minutes. Side effects are a possible flu-like reaction with low grade fevers and chills and body aches over the 24-48 hours after the infusion which can occur in 5-10% of patients. If this occurs, you can take Tylenol or motrin for these symptoms. If you are willing to start this, please call me at [phone] so I can put in an order. 2) Your calcium and vitamin D are normal. 
 
3) Your prolactin is appropriately low. 4) Your free T4 (thyroid test) is normal. 
 
5) Please come for a follow up visit on 8/23/21 at 1:50pm in our [place] office. 6) Take the enclosed lab slip to repeat your labs prior to next visit.

## 2020-08-24 NOTE — PROGRESS NOTES
Chief Complaint   Patient presents with    Thyroid Problem    Other     face time [phone] - Iphone ( sometimes they have bad receiption)       **THIS IS A VIRTUAL VISIT VIA A VIDEO SYNCHRONOUS DISCUSSION. PATIENT AGREED TO HAVE THEIR CARE DELIVERED OVER A FACETIME VIDEO VISIT IN PLACE OF THEIR REGULARLY SCHEDULED OFFICE VISIT**    History of Present Illness: Nina Larkin is a 67 y.o. female here for follow up of pituitary disease. Was diagnosed with a-fib in Nov 2019 and pulmonary HTN and is under the care of Dr. Rivera Hewitt and in on flecainide and lasix and hasn't had any further issues with this. Compliant with all meds as directed and weight is 118 down from 120 in 8/19. No new headaches or change in vision. Has had some dizziness since being diagnosed with a-fib that is sporadic. No falls or fractures. Did have an ER visit in 1/20 for the palpitations and dizziness and was discharged home. Her potassium was low at 3.4 in the ER and was put on a potassium supplement by Dr. King Sesay but it's too hard to swallow so she hasn't been taking this and her level is currently normal.   I asked her what happened after her visit last year when we had discussed starting prolia or reclast and she never let me know what she wanted to do and she said she thought about it but then was diagnosed with a-fib and dealt with this and then Gus hit and she just never called me back. Is willing to consider reclast now. Current Outpatient Medications   Medication Sig    flecainide (TAMBOCOR) 50 mg tablet Take 50 mg by mouth two (2) times a day.  furosemide (LASIX) 20 mg tablet Take 20 mg by mouth daily.     desmopressin (DDAVP NASAL) 10 mcg/spray (0.1 mL) solution USE 1 SPRAY IN LEFT NOSTRIL AS DIRECTED EVERY NIGHT    cabergoline (DOSTINEX) 0.5 mg tablet TAKE 1 TABLET TWICE WEEKLY    levothyroxine (SYNTHROID) 75 mcg tablet TAKE 1 TABLET EVERY DAY IN THE MORNING  BEFORE  BREAKFAST    hydrocortisone (CORTEF) 5 mg tablet TAKE 2 TABLETS EVERY MORNING AND 1 TABLET AT 4PM TO 5 PM    acetaminophen (TYLENOL EXTRA STRENGTH) 500 mg tablet Take  by mouth as needed for Pain.  cholecalciferol, vitamin D3, (VITAMIN D3) 2,000 unit Tab Take  by mouth daily.  CALCIUM CARBONATE/VITAMIN D3 (CALCIUM 600 + D,3, PO) Take  by mouth daily.  atenolol (TENORMIN) 25 mg tablet Take 25 mg by mouth daily.  aspirin 81 mg tablet Take 81 mg by mouth daily. No current facility-administered medications for this visit. Allergies   Allergen Reactions    Amoxicillin Hives     Review of Systems: PER HPI    Physical Examination:  - GENERAL: NCAT, Appears well nourished   - EYES: EOMI, non-icteric, no proptosis   - Ear/Nose/Throat: NCAT, no visible inflammation or masses   - CARDIOVASCULAR: no cyanosis, no visible JVD   - RESPIRATORY: respiratory effort normal without any distress or labored breathing   - MUSCULOSKELETAL: Normal ROM of neck and upper extremities observed   - SKIN: No rash on face  - NEUROLOGIC:  No facial asymmetry (Cranial nerve 7 motor function), No gaze palsy   - PSYCHIATRIC: Normal affect, Normal insight and judgement       Data Reviewed:   Component      Latest Ref Rng & Units 8/14/2020 8/14/2020 8/14/2020 8/14/2020          12:14 PM 12:14 PM 12:14 PM 12:14 PM   Glucose      65 - 99 mg/dL    90   BUN      8 - 27 mg/dL    18   Creatinine      0.57 - 1.00 mg/dL    0.89   GFR est non-AA      >59 mL/min/1.73    62   GFR est AA      >59 mL/min/1.73    72   BUN/Creatinine ratio      12 - 28    20   Sodium      134 - 144 mmol/L    141   Potassium      3.5 - 5.2 mmol/L    4.1   Chloride      96 - 106 mmol/L    102   CO2      20 - 29 mmol/L    26   Calcium      8.7 - 10.3 mg/dL    9.7   T4, Free      0.82 - 1.77 ng/dL   1.18    Prolactin      4.8 - 23.3 ng/mL  <0.1 (L)     VITAMIN D, 25-HYDROXY      30.0 - 100.0 ng/mL 34.8          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 Carlos Crawford. She had visual field disturbance prior to surgery but per pt, this was normal in September 2012 with Dr. Dona Ervin. She has had repeat MRI with Dr. Carlos Crawford in September 2012 and the cyst is still present. She did have a prolactin of 52.8 prior to surgery and repeat was still elevated at 53 in March. Her level was higher at 78 in April and 71 in July and 80 in October. I discussed with Dr. Carlos Crawford about a trial of cabergoline 0.5 mg 2x/week and this was started in October and her prolactin level was <.0.1 in Jan 2013. I decreased her dose to 1/2 tab 2x/week. I spoke with Dr. Carlos Crawford at that time and we agreed to hold on repeating an MRI until another 3 months of treatment to see if we can keep her prolactin level in the normal or below normal range to hopefully shrink any residual tissue. In April 2013, her level was still undetectable so she went for repeat MRI in 5/13 that showed the volume of her pituitary mass had decreased from 1.3x1.4x0.9 cm = 1.63 cubic centimers to 0.9x1.16x0.8 cm= 0.84 cubic centimeters which is about a 50% decrease in volume. However in 6/14 her MRI showed her cyst to be 0.9x1. 3x0.9 cm = 1.05 cubic centimeters and even though the report said this was stable, given this was a slight increase from the prior year I decided to increase her dose back to a whole tablet 2x/week to try and further shrink her cyst.  It remained at 0.9x1. 3x0.9 cm in 8/15 and plan per Dr. Carlos Crawford was for repeat MRI in 2 years and apparently had this done in fall of 2017 and July 2018 and also had a CT scan in 1/18. Dr. Carlos Crawford sent me a note in 10/18 that said he reviewed all of her imaging and there has not been any significant change in her size of her cyst and recommended she come back in the fall of 2019.   Her prolactin has remained undetectable since 1/13.    - cont cabergoline whole 0.5 mg tab 2x/week   - check prolactin prior to next visit  - f/u with Dr. Mirna Box for visual field testing as needed in the future (last done in 8/18 and was normal)       2. 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 When she tried to taper to 1 spray every other night, had increase in her urine output and thirst so likely this is permanent and will have her stay at 1 spray at bedtime. Her sodium level has been normal on this dose. - check bmp prior to next visit  - cont DDAVP 1 spray at bedtime every night      3. Secondary hypothyroidism: was started on Synthroid 50 mcg daily based on a low total T4 of 3.1 with an inappropriately normal TSH of 0.777 in January 2012 prior to her surgery. I wasn't sure she truly had hypothyroidism that would necessitate life long treatment. Her repeat labs in March on this dose showed a low TSH of 0.097 with a normal free T4 of 1.07. I had her stop the dose in March and off treatment she didn't feel much different but her free T4 was slightly low at 0.8 and her TSH was normal at 1.43 in April. Given that she was not symptomatic, I held on restarting treatment but her free T4 was lower at 0.75 in July 2012 and she was having more fatigue so I restarted this and she has felt better. Free T4 normal at 1.07 in Jan 2013 with a low TSH of 0.038. I will just follow the free T4 as the TSH is likely to be low in someone with a history of pituitary surgery and repeat free T4 was 1.0 in April 2013 and 1.08 in 8/13 and 1.1 in 2/14. Down to 1.04 in 8/14 and given she had some fatigue and weight gain, I increased her dose to 75 mcg daily to see if this helps and it has and free T4 was 1.33 in 2/15 and 1.43 in 8/15 and 1.29 in 8/16 and 1.53 in 8/17 and 1.3 in 8/18 and 1.49 in 8/19. Down to 1.18 in 8/20 but feels well so kept dose the same. - cont levothyroxine 75 mcg daily  - check free T4 prior to next visit      4.  Adrenal insufficiency: She was started on hydrocortisone 20 mg in am and 10 mg in pm after surgery. Her electrolytes have been fine so I had hoped she would not need long term steroids, especially given her history of osteoporosis. I decreased her to 15/5 in March but her morning cortisol was 2.8 in April after holding her pm dose. Her level was up to 6.6 in July so I cut back to 10/5 but her morning cortisol was 1.1 in October 2012 so she will definitely need this long term. Will not continue to check cortisol levels in the future. Advised her to get a medic alert bracelet and she has done this. Previously advised her to double her dose of HC in times of stress or infection in the future and again reminded her today  - cont HC 10 mg in am and 5 mg in pm  - wear medic alert bracelet      5. Osteoporosis:  Her vitamin D level was slightly low at 24 in April 2012 and after taking 2000 units/day it was up to 35.6 in July and 31.7 in October and 33.5 in 4/13 and 40 in 2/14 and 50 in 2/15 and 40 in 8/15 and 36.3 in 8/16 and 48 in 8/17 and 34 in 8/20. She had a DXA in 11/10 that showed a T-score of -3.0 at the L-spine, 0.0 at the wrist and -2.2 at the left femoral neck. She had been on treatment for about 10 years with one break of undetermined duration. I repeated a scan in May 2013. This showed a significant 13.5% increase in total hip compared to 2010 so I stopped the fosamax at that time and repeated a bone density scan in 7/15 which showed a 3.7% decrease at her right total hip and 1.4% decrease at her left total hip but both locations are in the osteopenic range. Her spine cannot be accurately assessed due to scoliosis so do not feel the 3.6% decrease as listed above is accurate. We decided to hold on any new meds at this time and simply repeat her DXA in 2 years.   This showed her spine had a 1.8% decrease and her hip had a 2.1% decrease but both were not significant and we agreed to hold on any treatment for another 2 years and focus on increasing her weight bearing activity through walking. Repeat DXA in 8/19 was done on a different machine so a reliable comparison cannot be made but her T-score is worse at her left hip and wrist and FRAX score is 27% for MOP fracture and 11% for hip fracture which are both above treatment threshold so I recommended either prolia or reclast in 8/19 and she wanted to think about these and never contacted me so at this time, I recommend reclast and she will be in touch if she wants to start this. Given her calcium level was 10.4 in 2/15, I cut back on calcium to once daily and level has been normal since. - cont calcium once daily  - check Vitamin D 25-OH level in 8/21 (DX E55.9)  - cont vitamin D 2000 units daily   - order repeat DXA 2 years after starting treatment  - begin reclast if she's agreeable          Patient Instructions   1) Your bone density had decreased in 2019 and your 10 year risk of fracture is 27% for any site other than the hip (over 20% we should consider treatment) and 11% for hip fractures (over 3% we should consider treatment). My recommendation is to begin Reclast, which is a once a year IV infusion to prevent fracture. This is given at the infusion center and involves having an IV placed and receiving an infusion over about 15 minutes. Side effects are a possible flu-like reaction with low grade fevers and chills and body aches over the 24-48 hours after the infusion which can occur in 5-10% of patients. If this occurs, you can take Tylenol or motrin for these symptoms. If you are willing to start this, please call me at [phone] so I can put in an order. 2) Your calcium and vitamin D are normal.    3) Your prolactin is appropriately low. 4) Your free T4 (thyroid test) is normal.    5) Please come for a follow up visit on 8/23/21 at 1:50pm in our [address] [place] [address] [place] office. 6) Take the enclosed lab slip to repeat your labs prior to next visit.             Follow-up and Dispositions    · Return 8/23/21 at 1:50pm. Copy sent to:  Dr. Stacie Butler follow up: 12/3/20  Component      Latest Ref Rng & Units 11/30/2020           1:55 PM   Glucose      65 - 99 mg/dL 114 (H)   BUN      8 - 27 mg/dL 18   Creatinine      0.57 - 1.00 mg/dL 0.88   GFR est non-AA      >59 mL/min/1.73 63   GFR est AA      >59 mL/min/1.73 72   BUN/Creatinine ratio      12 - 28 20   Sodium      134 - 144 mmol/L 140   Potassium      3.5 - 5.2 mmol/L 4.0   Chloride      96 - 106 mmol/L 99   CO2      20 - 29 mmol/L 24   Calcium      8.7 - 10.3 mg/dL 9.5     Her calcium and kidney function are normal so will have [facility] call her with this information to let her know we will get her scheduled for Reclast infusion.

## 2020-08-24 NOTE — LETTER
8/24/2020 Ms. Dunia Underwood [address] [facility],[address] [id number] Dear Dunia Underwood: 
 
Please find your most recent results below. Resulted Orders METABOLIC PANEL, BASIC (Collected: 8/14/2020 12:14 PM) Result Value Ref Range Glucose 90 65 - 99 mg/dL BUN 18 8 - 27 mg/dL Creatinine 0.89 0.57 - 1.00 mg/dL GFR est non-AA 62 >59 mL/min/1.73 GFR est AA 72 >59 mL/min/1.73  
 BUN/Creatinine ratio 20 12 - 28 Sodium 141 134 - 144 mmol/L Potassium 4.1 3.5 - 5.2 mmol/L Chloride 102 96 - 106 mmol/L  
 CO2 26 20 - 29 mmol/L Calcium 9.7 8.7 - 10.3 mg/dL Narrative Performed at:  [address]  [id number] : Gato Guido MD, Phone:  [phone] T4, FREE (Collected: 8/14/2020 12:14 PM) Result Value Ref Range T4, Free 1.18 0.82 - 1.77 ng/dL Narrative Performed at:  [address]  [id number] : Gato Guido MD, Phone:  [phone] PROLACTIN (Collected: 8/14/2020 12:14 PM) Result Value Ref Range Prolactin <0.1 (L) 4.8 - 23.3 ng/mL Narrative Performed at:  [address]  [id number] : Gato Guido MD, Phone:  [phone] VITAMIN D, 25 HYDROXY (Collected: 8/14/2020 12:14 PM) Result Value Ref Range VITAMIN D, 25-HYDROXY 34.8 30.0 - 100.0 ng/mL Comment:  
   Vitamin D deficiency has been defined by the [address] practice guideline as a 
level of serum 25-OH vitamin D less than 20 ng/mL (1,2). The Endocrine Society went on to further define vitamin D 
insufficiency as a level between 21 and 29 ng/mL (2). 1. IOM ([place] [employer]). 2010. Dietary reference 
   intakes for calcium and D. [facility]: The 
   [employer].  
2. Sherry MCKEON, Benita DE JESUS, Kirit RAY, et al. 
 Evaluation, treatment, and prevention of vitamin D 
   deficiency: an Endocrine Society clinical practice 
   guideline. JCEM. 2011 Jul; 96(7):1911-30. Narrative Performed at:  [facility] [address] [place] [address]  [id number] : Sim Dill MD, Phone:  [phone]  
 
 
1) Your bone density had decreased in 2019 and your 10 year risk of fracture is 27% for any site other than the hip (over 20% we should consider treatment) and 11% for hip fractures (over 3% we should consider treatment). My recommendation is to begin Reclast, which is a once a year IV infusion to prevent fracture. This is given at the infusion center and involves having an IV placed and receiving an infusion over about 15 minutes. Side effects are a possible flu-like reaction with low grade fevers and chills and body aches over the 24-48 hours after the infusion which can occur in 5-10% of patients. If this occurs, you can take Tylenol or motrin for these symptoms. If you are willing to start this, please call me at [phone] so I can put in an order. 2) Your calcium and vitamin D are normal. 
 
3) Your prolactin is appropriately low. 4) Your free T4 (thyroid test) is normal. 
 
5) Please come for a follow up visit on 8/23/21 at 1:50pm in our [place] office. 6) Take the enclosed lab slip to repeat your labs prior to next visit. Please call me if you have any questions: [phone] Sincerely, 
 
 
Regina Mcgovern MD

## 2020-10-20 RX ORDER — CABERGOLINE 0.5 MG/1
TABLET ORAL
Qty: 24 TAB | Refills: 3 | Status: SHIPPED | OUTPATIENT
Start: 2020-10-20 | End: 2021-06-29 | Stop reason: SDUPTHER

## 2020-10-24 RX ORDER — LEVOTHYROXINE SODIUM 75 UG/1
TABLET ORAL
Qty: 90 TAB | Refills: 3 | Status: SHIPPED | OUTPATIENT
Start: 2020-10-24 | End: 2021-07-21

## 2020-11-12 ENCOUNTER — TELEPHONE (OUTPATIENT)
Dept: ENDOCRINOLOGY | Age: 80
End: 2020-11-12

## 2020-11-12 DIAGNOSIS — M81.0 AGE-RELATED OSTEOPOROSIS WITHOUT CURRENT PATHOLOGICAL FRACTURE: Primary | ICD-10-CM

## 2020-11-12 NOTE — TELEPHONE ENCOUNTER
----- Message from Izabela Stein sent at 11/12/2020  1:33 PM EST -----  Regarding: Dr Nadia Iglesias Message/Vendor Calls    Caller's first and last name:      Reason for call: Pt is reaching out to let the doctor know that she has decided to do the Reclast Infusion and is requesting an order called  in and scheduled      Callback required yes/no and why:      Best contact number(s): [phone]      Details to clarify the request:      Izabela Stein

## 2020-11-12 NOTE — TELEPHONE ENCOUNTER
Please let her know that I am happy to put an order into the system but given it's been 3 months since her labs were drawn, she needs to have a repeat kidney function within 30 days of the infusion or they won't give it to her the day of the infusion.   I put an order directly into the [facility] system to repeat your labs now so just ask for the order under my name and I will be in touch with the results and provided the function is still normal, I can put the order in for the Reclast.

## 2020-11-17 NOTE — TELEPHONE ENCOUNTER
I was able to reach patient now and relayed the message to go and get labs now to check her kidney function and she will go in the next 1-2 weeks as she is not feeling 100% at this time.

## 2020-12-01 LAB
BUN SERPL-MCNC: 18 MG/DL (ref 8–27)
BUN/CREAT SERPL: 20 (ref 12–28)
CALCIUM SERPL-MCNC: 9.5 MG/DL (ref 8.7–10.3)
CHLORIDE SERPL-SCNC: 99 MMOL/L (ref 96–106)
CO2 SERPL-SCNC: 24 MMOL/L (ref 20–29)
CREAT SERPL-MCNC: 0.88 MG/DL (ref 0.57–1)
GLUCOSE SERPL-MCNC: 114 MG/DL (ref 65–99)
POTASSIUM SERPL-SCNC: 4 MMOL/L (ref 3.5–5.2)
SODIUM SERPL-SCNC: 140 MMOL/L (ref 134–144)

## 2020-12-03 ENCOUNTER — TELEPHONE (OUTPATIENT)
Dept: ENDOCRINOLOGY | Age: 80
End: 2020-12-03

## 2020-12-03 NOTE — TELEPHONE ENCOUNTER
I spoke with Ms. Saritha Omaira and she stated that she had her labs drawn and she would like to know if she can have the infusion?

## 2020-12-03 NOTE — TELEPHONE ENCOUNTER
Please let her know that I was on vacation last week and am just now getting to all the labs that have come in. I saw that her calcium and kidney function are normal so I will write the order for the Reclast tomorrow and we'll fax it to the infusion center. Please have her call [phone] on Monday to get this scheduled. Thanks.

## 2020-12-03 NOTE — TELEPHONE ENCOUNTER
----- Message from Curtis Lind sent at 12/3/2020  3:14 PM EST -----  Regarding: Dr. Pili Shearer General Message/Vendor Calls    Caller's first and last name:      Reason for call: Regarding scheduling reclast infusion.       Call back required yes/no and why: Yes       Best contact number(s): [phone]      Details to clarify the request:      Curtis Lind

## 2021-01-05 RX ORDER — ZOLEDRONIC ACID 5 MG/100ML
5 INJECTION, SOLUTION INTRAVENOUS ONCE
Status: COMPLETED | OUTPATIENT
Start: 2021-01-11 | End: 2021-01-11

## 2021-01-05 RX ORDER — ACETAMINOPHEN 325 MG/1
650 TABLET ORAL ONCE
Status: ACTIVE | OUTPATIENT
Start: 2021-01-11 | End: 2021-01-11

## 2021-01-11 ENCOUNTER — HOSPITAL ENCOUNTER (OUTPATIENT)
Dept: INFUSION THERAPY | Age: 81
Discharge: HOME | End: 2021-01-11
Payer: MEDICARE

## 2021-01-11 VITALS
DIASTOLIC BLOOD PRESSURE: 72 MMHG | HEART RATE: 70 BPM | WEIGHT: 115.1 LBS | BODY MASS INDEX: 21.18 KG/M2 | RESPIRATION RATE: 18 BRPM | SYSTOLIC BLOOD PRESSURE: 137 MMHG | HEIGHT: 62 IN | TEMPERATURE: 97.3 F

## 2021-01-11 PROCEDURE — 96374 THER/PROPH/DIAG INJ IV PUSH: CPT

## 2021-01-11 PROCEDURE — 74011250636 HC RX REV CODE- 250/636: Performed by: INTERNAL MEDICINE

## 2021-01-11 RX ADMIN — ZOLEDRONIC ACID 5 MG: 5 INJECTION, SOLUTION INTRAVENOUS at 14:38

## 2021-01-11 NOTE — PROGRESS NOTES
[address] [facility] Visit Note    3742 Pt arrived at [facility] ambulatory and in no distress for Reclast.  Assessment completed, no new complaints voiced. Reclast discussed. PO hydration encouraged. OK to use labs from 11/30/20. Pt declined Tylenol. Patient Vitals for the past 12 hrs:   Temp Pulse Resp BP   01/11/21 1459 -- 70 -- 137/72   01/11/21 1410 97.3 °F (36.3 °C) 76 18 (!) 154/81       Medications received:  Medications Administered     zoledronic acid (RECLAST) IVPB 5 mg     Admin Date  01/11/2021 Action  Given Dose  5 mg Rate  400 mL/hr Route  IntraVENous Administered By  Berenice Nicole, RN                1500 Tolerated treatment well, no adverse reaction noted. IV d/c'd. D/Cd from [facility] ambulatory and in no distress accompanied by daughter. PSR calling pt about next appointment.  PSR scheduled for 1/18 but meant to schedule

## 2021-01-11 NOTE — DISCHARGE INSTRUCTIONS
OUTPATIENT INFUSION CENTER    DISCHARGE INSTRUCTIONS FOR:  RECLAST (ZOLEDRONIC ACID):    1.  Be sure to inform your Dentist that you are taking this drug prior to invasive dental procedures. You should avoid major dental work for a while after you are treated with this medicine. Report any signs/symptoms of jaw pain to your physician immediately. 2.  Your doctor may order lab testing before each yearly dose of Reclast to check your calcium and kidney function. Your doctor may also prescribe Vitamin D and Calcium supplements. 3.  Make sure your doctor knows if you are taking fluid pills, arthritis medicines or antibiotics,  or if you have a history of problems with your gums, mouth or teeth. 4.  Drink some extra fluids during the 12-hour period before and after you receive Reclast.  Report any changes in urinary patterns (example: a decrease in how often you urinate). Also report rapid weight gain, swelling of the hands, ankles or feet, unusual tiredness or weakness or unusual bleeding or bruising. 5.  You may resume your normal activities after your infusion. Some people may have mild flu-like side effects from Reclast, especially with the first dose. These side effects are less common with subsequent doses. These may include:    Mild nausea, diarrhea, constipation or upset stomach; Red or irritated eyes; redness or itching at IV site;  Mild skin rash, mild numbness, tingling, or burning in extremities; Headache, dizziness, trouble sleeping, or mild muscle or joint pain, which can be relieved with a mild pain reliever such as Tyenol or Ibuprofen as directed by your physician;  Nasal congestion, runny nose, sneezing. 6.  Signs/Symptoms of an allergic reaction may require immediate medical attention. These are rare, but may include one or more of the following:     Skin - Swelling, blistering, weeping, crusting, rash, itching or;   Wheezing, cough, sore throat, chest tightness, chest pain or shortness of breath, irregular heart beat;  Swelling of the face, eyelids, lips, tongue, or throat; severe dry mouth; Severe red (bloodshot), itchy, swollen, watery or painful eyes;  Stomach pain, loss of appetite, nausea, vomiting, or bloody diarrhea;  Severe headache, sleepiness or changes in personality;  Numbness, tingling or pain around the mouth, teeth, or jaw. Contact your physician if you have questions or concerns, or experience any of the above symptoms.     Nito Wakefield, Signature: ______________________________ 1/11/2021  Zaira Wright, RN

## 2021-01-15 ENCOUNTER — TRANSCRIBE ORDER (OUTPATIENT)
Dept: ENDOCRINOLOGY | Age: 81
End: 2021-01-15

## 2021-01-15 RX ORDER — HYDROCORTISONE 5 MG/1
TABLET ORAL
Qty: 270 TAB | Refills: 3 | Status: SHIPPED | OUTPATIENT
Start: 2021-01-15 | End: 2021-04-15 | Stop reason: SDUPTHER

## 2021-01-15 NOTE — TELEPHONE ENCOUNTER
----- Message from Beatrice Ignacio sent at 1/15/2021 11:04 AM EST -----  Regarding: Dr. Gerda Mendez  Env Medication Refill    Caller (if not patient):      Relationship of caller (if not patient):      Best contact number(s): [phone]      Name of medication and dosage if known: hydrocortisone (CORTEF) 5 mg tablet       Is patient out of this medication (yes/no): Yes       Pharmacy name: [facility] listed in chart? (yes/no): Yes     Pharmacy phone number: [phone]      Details to clarify the request:      Beatrice Ignacio

## 2021-04-14 RX ORDER — DESMOPRESSIN ACETATE 0.1 MG/ML
SOLUTION NASAL
Qty: 10 ML | Refills: 11 | Status: SHIPPED | OUTPATIENT
Start: 2021-04-14 | End: 2022-01-08 | Stop reason: CLARIF

## 2021-04-15 ENCOUNTER — TELEPHONE (OUTPATIENT)
Dept: ENDOCRINOLOGY | Age: 81
End: 2021-04-15

## 2021-04-15 RX ORDER — HYDROCORTISONE 5 MG/1
TABLET ORAL
Qty: 270 TAB | Refills: 3 | Status: SHIPPED | OUTPATIENT
Start: 2021-04-15 | End: 2021-10-29

## 2021-04-15 NOTE — TELEPHONE ENCOUNTER
----- Message from Jacquelin Blas sent at 4/15/2021 12:46 PM EDT -----  Regarding: MD Beard/ telephone  Patient's first and last name:   Rica Marion  : 1940    Caller's first and last name:  pt    Reason for call: medication    Callback required yes/no and why: yes     Best contact number(s):  [phone]    Details to clarify the request: Pt is requesting to have RX Hydrocortisone sent to local pharmacy [facility] on file. [facility] mail order is unable to ship medication in time for pt. Pt is currently out of medication.

## 2021-06-29 RX ORDER — CABERGOLINE 0.5 MG/1
TABLET ORAL
Qty: 24 TABLET | Refills: 3 | Status: SHIPPED | OUTPATIENT
Start: 2021-06-29 | End: 2022-04-15

## 2021-07-08 NOTE — TELEPHONE ENCOUNTER
She should take all of her medications including the desmopressin exactly the same during the prep for the colonoscopy. Cardiac

## 2021-07-21 RX ORDER — LEVOTHYROXINE SODIUM 75 UG/1
TABLET ORAL
Qty: 90 TABLET | Refills: 3 | Status: SHIPPED | OUTPATIENT
Start: 2021-07-21 | End: 2022-06-01

## 2021-08-09 DIAGNOSIS — M81.0 AGE-RELATED OSTEOPOROSIS WITHOUT CURRENT PATHOLOGICAL FRACTURE: ICD-10-CM

## 2021-08-09 DIAGNOSIS — E27.40 ADRENAL INSUFFICIENCY (HCC): ICD-10-CM

## 2021-08-09 DIAGNOSIS — E23.6 PITUITARY CYST (HCC): ICD-10-CM

## 2021-08-09 DIAGNOSIS — E03.8 SECONDARY HYPOTHYROIDISM: ICD-10-CM

## 2021-08-09 DIAGNOSIS — E23.2 DIABETES INSIPIDUS (HCC): ICD-10-CM

## 2021-08-09 DIAGNOSIS — E55.9 VITAMIN D DEFICIENCY: ICD-10-CM

## 2021-08-18 LAB
BUN SERPL-MCNC: 28 MG/DL (ref 8–27)
BUN/CREAT SERPL: 27 (ref 12–28)
CALCIUM SERPL-MCNC: 10.2 MG/DL (ref 8.7–10.3)
CHLORIDE SERPL-SCNC: 99 MMOL/L (ref 96–106)
CO2 SERPL-SCNC: 27 MMOL/L (ref 20–29)
CREAT SERPL-MCNC: 1.03 MG/DL (ref 0.57–1)
GLUCOSE SERPL-MCNC: 90 MG/DL (ref 65–99)
INTERPRETATION: NORMAL
POTASSIUM SERPL-SCNC: 4.2 MMOL/L (ref 3.5–5.2)
PROLACTIN SERPL-MCNC: 0.1 NG/ML (ref 4.8–23.3)
SODIUM SERPL-SCNC: 139 MMOL/L (ref 134–144)
T4 FREE SERPL-MCNC: 1.56 NG/DL (ref 0.82–1.77)

## 2021-08-23 ENCOUNTER — OFFICE VISIT (OUTPATIENT)
Dept: ENDOCRINOLOGY | Age: 81
End: 2021-08-23
Payer: MEDICARE

## 2021-08-23 VITALS
HEIGHT: 62 IN | DIASTOLIC BLOOD PRESSURE: 80 MMHG | WEIGHT: 117.8 LBS | BODY MASS INDEX: 21.68 KG/M2 | HEART RATE: 86 BPM | SYSTOLIC BLOOD PRESSURE: 143 MMHG

## 2021-08-23 DIAGNOSIS — E55.9 VITAMIN D DEFICIENCY: ICD-10-CM

## 2021-08-23 DIAGNOSIS — E03.8 SECONDARY HYPOTHYROIDISM: ICD-10-CM

## 2021-08-23 DIAGNOSIS — E23.6 PITUITARY CYST (HCC): Primary | ICD-10-CM

## 2021-08-23 DIAGNOSIS — M81.0 AGE-RELATED OSTEOPOROSIS WITHOUT CURRENT PATHOLOGICAL FRACTURE: ICD-10-CM

## 2021-08-23 DIAGNOSIS — E27.40 ADRENAL INSUFFICIENCY (HCC): ICD-10-CM

## 2021-08-23 DIAGNOSIS — E23.2 DIABETES INSIPIDUS (HCC): ICD-10-CM

## 2021-08-23 PROCEDURE — G8536 NO DOC ELDER MAL SCRN: HCPCS | Performed by: INTERNAL MEDICINE

## 2021-08-23 PROCEDURE — 1101F PT FALLS ASSESS-DOCD LE1/YR: CPT | Performed by: INTERNAL MEDICINE

## 2021-08-23 PROCEDURE — 1090F PRES/ABSN URINE INCON ASSESS: CPT | Performed by: INTERNAL MEDICINE

## 2021-08-23 PROCEDURE — G8432 DEP SCR NOT DOC, RNG: HCPCS | Performed by: INTERNAL MEDICINE

## 2021-08-23 PROCEDURE — 99214 OFFICE O/P EST MOD 30 MIN: CPT | Performed by: INTERNAL MEDICINE

## 2021-08-23 PROCEDURE — G8420 CALC BMI NORM PARAMETERS: HCPCS | Performed by: INTERNAL MEDICINE

## 2021-08-23 PROCEDURE — G8427 DOCREV CUR MEDS BY ELIG CLIN: HCPCS | Performed by: INTERNAL MEDICINE

## 2021-08-23 NOTE — PATIENT INSTRUCTIONS
1) Your creatinine (kidney test) was slightly high due to mild dehydration. Drink at least 4 8oz glasses of water everyday to stay hydrated to prevent your creatinine level from going higher. 2) Your free T4 (thyroid test) is perfect so I will keep your dose the same of levothyroxine. 3) Your prolactin (pituitary test) is appropriately low so keep taking cabergoline 1 tab 2x/week. 4) Plan on having your kidney function and vitamin D repeated 1-2 weeks prior to your next Reclast infusion on 1/18/22 and I'll be in touch to make sure it's safe to have this infuion. 5) I will arrange a repeat bone density scan prior to your next visit in August 2022.

## 2021-08-23 NOTE — PROGRESS NOTES
Chief Complaint   Patient presents with    Thyroid Problem     pcp and pharmacy confirmed     History of Present Illness: Duke Page is a 79 y.o. female here for follow up of thyroid. Has not had any further a-fib with the flecainide and no major change to her health since 8/20. Tolerated the Reclast infusion in 1/21 without any side effects. No falls or fractures. Takes the levothyroxine at bedtime and hasn't missed any doses. Bowels are regular. No heat or cold intolerance. No change in hair or nails. Has been dealing with a rash on her upper chest and neck and was seen by dermatology and prescribed a steroid cream.  Weight is 117 down from 118 last year. Takes the cabergoline 1 tab every Mon and Thurs and no headaches or change in vision. Still taking HC 2 tabs in am and 1 tab in the afternoon. May only get some dizziness when her heart starts fluttering but otherwise not. No abd pain or n/v.  Still using 1 spray of DDAVP at night and no increased thirst or urination. Current Outpatient Medications   Medication Sig    levothyroxine (SYNTHROID) 75 mcg tablet TAKE 1 TABLET EVERY DAY IN THE MORNING  BEFORE  BREAKFAST    cabergoline (DOSTINEX) 0.5 mg tablet TAKE 1 TABLET TWICE WEEKLY    hydrocortisone (CORTEF) 5 mg tablet TAKE 2 TABLETS EVERY MORNING AND 1 TABLET AT 4PM TO 5 PM    desmopressin (DDAVP NASAL) 10 mcg/spray (0.1 mL) solution USE 1 SPRAY IN LEFT NOSTRIL AS DIRECTED EVERY NIGHT    flecainide (TAMBOCOR) 50 mg tablet Take 50 mg by mouth two (2) times a day.  furosemide (LASIX) 20 mg tablet Take 20 mg by mouth daily.  acetaminophen (TYLENOL EXTRA STRENGTH) 500 mg tablet Take  by mouth as needed for Pain.  cholecalciferol, vitamin D3, (VITAMIN D3) 2,000 unit Tab Take  by mouth daily.  CALCIUM CARBONATE/VITAMIN D3 (CALCIUM 600 + D,3, PO) Take  by mouth daily.  atenolol (TENORMIN) 25 mg tablet Take 25 mg by mouth daily.       aspirin 81 mg tablet Take 81 mg by mouth daily. No current facility-administered medications for this visit. Allergies   Allergen Reactions    Amoxicillin Hives     Review of Systems: PER HPI    Physical Examination:  Blood pressure (!) 143/80, pulse 86, height 5' 2\" (1.575 m), weight 117 lb 12.8 oz (53.4 kg). - General: pleasant, no distress, good eye contact   - Neck: no carotid bruits  - Cardiovascular: regular, normal rate, nl s1 and s2, no m/r/g   - Respiratory: clear to auscultation bilaterally   - Integumentary: skin is normal, no edema  - Neurological: reflexes 2+ at biceps, very mild tremor  - Psychiatric: normal mood and affect    Data Reviewed:   Component      Latest Ref Rng & Units 8/17/2021 8/17/2021 8/17/2021          10:28 AM 10:28 AM 10:28 AM   Glucose      65 - 99 mg/dL  90    BUN      8 - 27 mg/dL  28 (H)    Creatinine      0.57 - 1.00 mg/dL  1.03 (H)    GFR est non-AA      >59 mL/min/1.73  52 (L)    GFR est AA      >59 mL/min/1.73  60    BUN/Creatinine ratio      12 - 28  27    Sodium      134 - 144 mmol/L  139    Potassium      3.5 - 5.2 mmol/L  4.2    Chloride      96 - 106 mmol/L  99    CO2      20 - 29 mmol/L  27    Calcium      8.7 - 10.3 mg/dL  10.2    T4, Free      0.82 - 1.77 ng/dL   1.56   Prolactin      4.8 - 23.3 ng/mL 0.1 (L)       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 Rosa Hernandez. She had visual field disturbance prior to surgery but per pt, this was normal in September 2012 with Dr. Ajay Martínez. She has had repeat MRI with Dr. Rosa Hernandez in September 2012 and the cyst is still present. She did have a prolactin of 52.8 prior to surgery and repeat was still elevated at 53 in March. Her level was higher at 78 in April and 71 in July and 80 in October. I discussed with Dr. Rosa Hernandez about a trial of cabergoline 0.5 mg 2x/week and this was started in October and her prolactin level was <.0.1 in Jan 2013.   I decreased her dose to 1/2 tab 2x/week. I spoke with Dr. Hector Salinas at that time and we agreed to hold on repeating an MRI until another 3 months of treatment to see if we can keep her prolactin level in the normal or below normal range to hopefully shrink any residual tissue. In April 2013, her level was still undetectable so she went for repeat MRI in 5/13 that showed the volume of her pituitary mass had decreased from 1.3x1.4x0.9 cm = 1.63 cubic centimers to 0.9x1.16x0.8 cm= 0.84 cubic centimeters which is about a 50% decrease in volume. However in 6/14 her MRI showed her cyst to be 0.9x1. 3x0.9 cm = 1.05 cubic centimeters and even though the report said this was stable, given this was a slight increase from the prior year I decided to increase her dose back to a whole tablet 2x/week to try and further shrink her cyst.  It remained at 0.9x1. 3x0.9 cm in 8/15 and plan per Dr. Hector Salinas was for repeat MRI in 2 years and apparently had this done in fall of 2017 and July 2018 and also had a CT scan in 1/18. Dr. Hector Salinas sent me a note in 10/18 that said he reviewed all of her imaging and there has not been any significant change in her size of her cyst and recommended she come back in the fall of 2019. Her prolactin has remained undetectable since 1/13.    - cont cabergoline whole 0.5 mg tab 2x/week   - check prolactin prior to next visit  - f/u with Dr. Ashley Parker for visual field testing as needed in the future (last done in 8/18 and was normal)         2. 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 When she tried to taper to 1 spray every other night, had increase in her urine output and thirst so likely this is permanent and will have her stay at 1 spray at bedtime. Her sodium level has been normal on this dose. - check bmp prior to next visit  - cont DDAVP 1 spray at bedtime every night        3.  Secondary hypothyroidism: was started on Synthroid 50 mcg daily based on a low total T4 of 3.1 with an inappropriately normal TSH of 0.777 in January 2012 prior to her surgery. I wasn't sure she truly had hypothyroidism that would necessitate life long treatment. Her repeat labs in March on this dose showed a low TSH of 0.097 with a normal free T4 of 1.07. I had her stop the dose in March and off treatment she didn't feel much different but her free T4 was slightly low at 0.8 and her TSH was normal at 1.43 in April. Given that she was not symptomatic, I held on restarting treatment but her free T4 was lower at 0.75 in July 2012 and she was having more fatigue so I restarted this and she has felt better. Free T4 normal at 1.07 in Jan 2013 with a low TSH of 0.038. I will just follow the free T4 as the TSH is likely to be low in someone with a history of pituitary surgery and repeat free T4 was 1.0 in April 2013 and 1.08 in 8/13 and 1.1 in 2/14. Down to 1.04 in 8/14 and given she had some fatigue and weight gain, I increased her dose to 75 mcg daily to see if this helps and it has and free T4 was 1.33 in 2/15 and 1.43 in 8/15 and 1.29 in 8/16 and 1.53 in 8/17 and 1.3 in 8/18 and 1.49 in 8/19. Down to 1.18 in 8/20 but felt well so kept dose the same and up to 1.56 in 8/21.  - cont levothyroxine 75 mcg daily at bedtime  - check free T4 prior to next visit        4. Adrenal insufficiency: She was started on hydrocortisone 20 mg in am and 10 mg in pm after surgery. Her electrolytes have been fine so I had hoped she would not need long term steroids, especially given her history of osteoporosis. I decreased her to 15/5 in March but her morning cortisol was 2.8 in April after holding her pm dose. Her level was up to 6.6 in July so I cut back to 10/5 but her morning cortisol was 1.1 in October 2012 so she will definitely need this long term. Will not continue to check cortisol levels in the future. Advised her to get a medic alert bracelet and she has done this.   Previously advised her to double her dose of HC in times of stress or infection in the future and again reminded her today  - cont HC 10 mg in am and 5 mg in pm  - wear medic alert bracelet        5. Osteoporosis:  Her vitamin D level was slightly low at 24 in April 2012 and after taking 2000 units/day it was up to 35.6 in July and 31.7 in October and 33.5 in 4/13 and 40 in 2/14 and 50 in 2/15 and 40 in 8/15 and 36.3 in 8/16 and 48 in 8/17 and 34 in 8/20. She had a DXA in 11/10 that showed a T-score of -3.0 at the L-spine, 0.0 at the wrist and -2.2 at the left femoral neck. She had been on treatment for about 10 years with one break of undetermined duration. I repeated a scan in May 2013. This showed a significant 13.5% increase in total hip compared to 2010 so I stopped the fosamax at that time and repeated a bone density scan in 7/15 which showed a 3.7% decrease at her right total hip and 1.4% decrease at her left total hip but both locations are in the osteopenic range. Her spine cannot be accurately assessed due to scoliosis so do not feel the 3.6% decrease as listed above is accurate. We decided to hold on any new meds at this time and simply repeat her DXA in 2 years. This showed her spine had a 1.8% decrease and her hip had a 2.1% decrease but both were not significant and we agreed to hold on any treatment for another 2 years and focus on increasing her weight bearing activity through walking. Repeat DXA in 8/19 was done on a different machine so a reliable comparison cannot be made but her T-score is worse at her left hip and wrist and FRAX score is 27% for MOP fracture and 11% for hip fracture which are both above treatment threshold so I recommended either prolia or reclast in 8/19 and she wanted to think about these and never contacted me but did agree to have this after her visit in 8/20 and received her 1st dose in Jan 2021. Will plan on 2 doses and then repeat her DXA in the summer of 2022.  Given her calcium level was 10.4 in 2/15, I cut back on calcium to once daily and level has been normal since. - cont calcium once daily  - check Vitamin D 25-OH level in 1/22 (DX E55.9)  - cont vitamin D 2000 units daily   - order repeat DXA in 8/22  - cont reclast with next dose in Jan 2022          Patient Instructions   1) Your creatinine (kidney test) was slightly high due to mild dehydration. Drink at least 4 8oz glasses of water everyday to stay hydrated to prevent your creatinine level from going higher. 2) Your free T4 (thyroid test) is perfect so I will keep your dose the same of levothyroxine. 3) Your prolactin (pituitary test) is appropriately low so keep taking cabergoline 1 tab 2x/week. 4) Plan on having your kidney function and vitamin D repeated 1-2 weeks prior to your next Reclast infusion on 1/18/22 and I'll be in touch to make sure it's safe to have this infuion. 5) I will arrange a repeat bone density scan prior to your next visit in August 2022. Follow-up and Dispositions    · Return in about 1 year (around 8/23/2022).                Copy sent to:  Dr. Sigifredo Ling follow up: 03/31/22    Sent her the following message in a letter and had my my nurse/MA call her with her results:    Resulted Orders   METABOLIC PANEL, BASIC   Result Value Ref Range    Glucose 109 (H) 65 - 99 mg/dL    BUN 16 8 - 27 mg/dL    Creatinine 0.91 0.57 - 1.00 mg/dL    eGFR 64 >59 mL/min/1.73    BUN/Creatinine ratio 18 12 - 28    Sodium 137 134 - 144 mmol/L    Potassium 3.9 3.5 - 5.2 mmol/L    Chloride 96 96 - 106 mmol/L    CO2 25 20 - 29 mmol/L    Calcium 9.5 8.7 - 10.3 mg/dL    Narrative    Performed at:  [address]  [id number]  : Jane Moody MD, Phone:  [phone]   VITAMIN D, 25 HYDROXY   Result Value Ref Range    VITAMIN D, 25-HYDROXY 47.0 30.0 - 100.0 ng/mL    Narrative    Performed at:  [place] [address] [place] [address] [place] [address]  [id number]  : Jackeline Michael MD, Phone:  [phone]       BUN and creatinine are markers of kidney function. Your values are normal.  -------------------------------------------------------------------------------------------------------------------  Your electrolytes (sodium, potassium, and calcium) are all normal.  Your glucose (blood sugar) was slightly high at 109 but I assume you had eaten and normal is under 140 if you had eaten.  -------------------------------------------------------------------------------------------------------------------  Your vitamin D level is 47 which is normal.  Goal is over 30. Please continue to take your current dose of vitamin D to keep your levels at goal.  -------------------------------------------------------------------------------------------------------------------  I have placed the order for Reclast so the [facility] center should be able to see this and someone should contact you within the next 2 business days to schedule your appointment. If you have not heard from them by then, please call [phone] for [facility] to get this scheduled. Thanks. Lab follow up: 05/20/22    Sent her the following message in a letter:    Resulted Orders   METABOLIC PANEL, BASIC   Result Value Ref Range    Glucose 93 65 - 99 mg/dL    BUN 15 8 - 27 mg/dL    Creatinine 0.86 0.57 - 1.00 mg/dL    eGFR 68 >59 mL/min/1.73    BUN/Creatinine ratio 17 12 - 28    Sodium 143 134 - 144 mmol/L    Potassium 4.0 3.5 - 5.2 mmol/L    Chloride 103 96 - 106 mmol/L    CO2 26 20 - 29 mmol/L    Calcium 9.5 8.7 - 10.3 mg/dL    Narrative    Performed at:  [address] [employer]  [address]  [id number]  : Jackeline Michael MD, Phone:  [phone]       BUN and creatinine are markers of kidney function.   Your values are normal.  -------------------------------------------------------------------------------------------------------------------  Your electrolytes (sodium, potassium, and calcium) are all normal.  Your glucose (blood sugar) is normal.  Therefore the Desmopressin tablets are controlling your sodium as well as the spray.

## 2021-10-29 RX ORDER — HYDROCORTISONE 5 MG/1
TABLET ORAL
Qty: 270 TABLET | Refills: 3 | Status: SHIPPED | OUTPATIENT
Start: 2021-10-29 | End: 2022-09-06 | Stop reason: SDUPTHER

## 2022-01-07 ENCOUNTER — TELEPHONE (OUTPATIENT)
Dept: ENDOCRINOLOGY | Age: 82
End: 2022-01-07

## 2022-01-07 NOTE — TELEPHONE ENCOUNTER
1/7/2022  2:02 PM    Patient called to advise that her [employer] insurance is now requiring PA for her desmopressin nasal spray and they listed an alternative in tablet form that she thinks doesn't require pre-auth. Also wants Dr Hayley Rios to know that she has a reclast infusion appt 01/18/22 but is going to put it off until later due to rise in covid cases.   Patient can be reached at [phone]    Thanks,  Philippe Dominique

## 2022-01-08 DIAGNOSIS — E55.9 VITAMIN D DEFICIENCY: ICD-10-CM

## 2022-01-08 RX ORDER — DESMOPRESSIN ACETATE 0.1 MG/1
0.1 TABLET ORAL
Qty: 90 TABLET | Refills: 3 | Status: SHIPPED | OUTPATIENT
Start: 2022-01-08 | End: 2022-10-31

## 2022-01-08 NOTE — TELEPHONE ENCOUNTER
You can let her know that I will change her to desmopressin 0.1 mg tablets and she will take 1 tablet at bedtime and I sent this to [facility] now. Once she starts this, I want her to go and have her sodium level checked one week after starting the pill to make sure that her level is staying normal.  I had given her a lab slip in August to check her kidney function and vitamin D 1-2 weeks prior to receiving her Reclast.  Since she will be postponing her Reclast due to COVID, I am ok with this but will still need these labs drawn this winter and this panel contains a sodium level so I recommend once she receives the new tablet in the mail, just use her lab order from August to have her vitamin D, sodium and kidney function drawn 1 week after starting the tablet. She can call the infusion center at [phone] to reschedule for when she feels comfortable.

## 2022-01-10 NOTE — TELEPHONE ENCOUNTER
I called Ms. Obed Valdes and relayed the message from Dr. Rich Mtz. She wrote this down and seemed to understand this information.    Cisco Goel

## 2022-01-12 DIAGNOSIS — M81.0 AGE-RELATED OSTEOPOROSIS WITHOUT CURRENT PATHOLOGICAL FRACTURE: Primary | ICD-10-CM

## 2022-01-12 RX ORDER — ACETAMINOPHEN 325 MG/1
650 TABLET ORAL AS NEEDED
Status: CANCELLED
Start: 2022-01-18

## 2022-01-12 RX ORDER — HEPARIN 100 UNIT/ML
300-500 SYRINGE INTRAVENOUS AS NEEDED
Status: CANCELLED
Start: 2022-01-18

## 2022-01-12 RX ORDER — SODIUM CHLORIDE 9 MG/ML
25 INJECTION, SOLUTION INTRAVENOUS CONTINUOUS
Status: CANCELLED | OUTPATIENT
Start: 2022-01-18 | End: 2022-01-19

## 2022-01-12 RX ORDER — HYDROCORTISONE SODIUM SUCCINATE 100 MG/2ML
100 INJECTION, POWDER, FOR SOLUTION INTRAMUSCULAR; INTRAVENOUS AS NEEDED
Status: CANCELLED | OUTPATIENT
Start: 2022-01-18

## 2022-01-12 RX ORDER — ALBUTEROL SULFATE 0.83 MG/ML
2.5 SOLUTION RESPIRATORY (INHALATION) AS NEEDED
Status: CANCELLED
Start: 2022-01-18

## 2022-01-12 RX ORDER — EPINEPHRINE 1 MG/ML
0.3 INJECTION, SOLUTION, CONCENTRATE INTRAVENOUS AS NEEDED
Status: CANCELLED | OUTPATIENT
Start: 2022-01-18

## 2022-01-12 RX ORDER — DIPHENHYDRAMINE HYDROCHLORIDE 50 MG/ML
50 INJECTION, SOLUTION INTRAMUSCULAR; INTRAVENOUS AS NEEDED
Status: CANCELLED
Start: 2022-01-18

## 2022-01-12 RX ORDER — ZOLEDRONIC ACID 5 MG/100ML
5 INJECTION, SOLUTION INTRAVENOUS ONCE
Status: CANCELLED | OUTPATIENT
Start: 2022-01-18 | End: 2022-01-18

## 2022-01-12 RX ORDER — SODIUM CHLORIDE 9 MG/ML
10 INJECTION INTRAMUSCULAR; INTRAVENOUS; SUBCUTANEOUS AS NEEDED
Status: CANCELLED | OUTPATIENT
Start: 2022-01-18

## 2022-01-12 RX ORDER — ONDANSETRON 2 MG/ML
8 INJECTION INTRAMUSCULAR; INTRAVENOUS AS NEEDED
Status: CANCELLED | OUTPATIENT
Start: 2022-01-18

## 2022-01-12 RX ORDER — DIPHENHYDRAMINE HYDROCHLORIDE 50 MG/ML
25 INJECTION, SOLUTION INTRAMUSCULAR; INTRAVENOUS AS NEEDED
Status: CANCELLED
Start: 2022-01-18

## 2022-01-12 RX ORDER — SODIUM CHLORIDE 0.9 % (FLUSH) 0.9 %
10 SYRINGE (ML) INJECTION AS NEEDED
Status: CANCELLED | OUTPATIENT
Start: 2022-01-18 | End: 2022-01-19

## 2022-01-18 ENCOUNTER — HOSPITAL ENCOUNTER (OUTPATIENT)
Dept: INFUSION THERAPY | Age: 82
End: 2022-01-18

## 2022-03-24 ENCOUNTER — TELEPHONE (OUTPATIENT)
Dept: ENDOCRINOLOGY | Age: 82
End: 2022-03-24

## 2022-03-24 NOTE — TELEPHONE ENCOUNTER
Please let her know that the order is in the [facility] system to draw her vitamin D and calcium and kidney function so she just needs to go sometime in the next week at her convenience and once the labs are back, I will reach out to her about getting scheduled for the reclast.    You won't need to fast for your labs so it's fine to eat the day of your lab draw.

## 2022-03-24 NOTE — TELEPHONE ENCOUNTER
3/24/2022  10:58 AM    Pt called and stated she has questions about the reclast infusion that she has to get done. Pt can be reached [phone].     Thanks,   Dejah Zhang

## 2022-03-24 NOTE — TELEPHONE ENCOUNTER
Patient stated she did not have her infusion in January due to Covid. She would like for it to now be rescheduled and have her labs ordered that she was suppose to have after the infusion.

## 2022-03-29 LAB
25(OH)D3+25(OH)D2 SERPL-MCNC: 47 NG/ML (ref 30–100)
BUN SERPL-MCNC: 16 MG/DL (ref 8–27)
BUN/CREAT SERPL: 18 (ref 12–28)
CALCIUM SERPL-MCNC: 9.5 MG/DL (ref 8.7–10.3)
CHLORIDE SERPL-SCNC: 96 MMOL/L (ref 96–106)
CO2 SERPL-SCNC: 25 MMOL/L (ref 20–29)
CREAT SERPL-MCNC: 0.91 MG/DL (ref 0.57–1)
EGFR: 64 ML/MIN/1.73
GLUCOSE SERPL-MCNC: 109 MG/DL (ref 65–99)
POTASSIUM SERPL-SCNC: 3.9 MMOL/L (ref 3.5–5.2)
SODIUM SERPL-SCNC: 137 MMOL/L (ref 134–144)

## 2022-03-31 ENCOUNTER — TELEPHONE (OUTPATIENT)
Dept: ENDOCRINOLOGY | Age: 82
End: 2022-03-31

## 2022-04-01 ENCOUNTER — TELEPHONE (OUTPATIENT)
Dept: ENDOCRINOLOGY | Age: 82
End: 2022-04-01

## 2022-04-01 NOTE — TELEPHONE ENCOUNTER
Patient has been notified of results and [facility] scheduling. She has expressed understanding.

## 2022-04-01 NOTE — TELEPHONE ENCOUNTER
Please call her with her results and let her know I sent her the following message in a letter:    BUN and creatinine are markers of kidney function. Your values are normal.  -------------------------------------------------------------------------------------------------------------------  Your electrolytes (sodium, potassium, and calcium) are all normal.  Your glucose (blood sugar) was slightly high at 109 but I assume you had eaten and normal is under 140 if you had eaten.  -------------------------------------------------------------------------------------------------------------------  Your vitamin D level is 47 which is normal.  Goal is over 30. Please continue to take your current dose of vitamin D to keep your levels at goal.  -------------------------------------------------------------------------------------------------------------------  I have placed the order for Reclast so the infusion center should be able to see this. Please call [phone] for [facility] to get this scheduled. Thanks.

## 2022-04-01 NOTE — TELEPHONE ENCOUNTER
----- Message from Gus Ramirez RN sent at 4/1/2022  3:55 PM EDT -----  She has been scheduled for 4/18/22. The order is current. Thanks  ----- Message -----  From: Alma Pena MD  Sent: 3/31/2022   8:45 PM EDT  To: Gus Ramirez RN    She is now ready to have her Reclast.  Can someone from your office reach out to her to get this scheduled? Please confirm my therapy order is still active in your system. Thanks.

## 2022-04-15 RX ORDER — CABERGOLINE 0.5 MG/1
TABLET ORAL
Qty: 24 TABLET | Refills: 3 | Status: SHIPPED | OUTPATIENT
Start: 2022-04-15

## 2022-04-18 ENCOUNTER — HOSPITAL ENCOUNTER (OUTPATIENT)
Dept: INFUSION THERAPY | Age: 82
Discharge: HOME | End: 2022-04-18
Payer: MEDICARE

## 2022-04-18 VITALS
HEART RATE: 71 BPM | TEMPERATURE: 97.6 F | DIASTOLIC BLOOD PRESSURE: 64 MMHG | WEIGHT: 118.9 LBS | OXYGEN SATURATION: 100 % | RESPIRATION RATE: 18 BRPM | BODY MASS INDEX: 21.88 KG/M2 | SYSTOLIC BLOOD PRESSURE: 109 MMHG | HEIGHT: 62 IN

## 2022-04-18 DIAGNOSIS — M81.0 AGE-RELATED OSTEOPOROSIS WITHOUT CURRENT PATHOLOGICAL FRACTURE: Primary | ICD-10-CM

## 2022-04-18 PROCEDURE — 74011250636 HC RX REV CODE- 250/636: Performed by: INTERNAL MEDICINE

## 2022-04-18 PROCEDURE — 96374 THER/PROPH/DIAG INJ IV PUSH: CPT

## 2022-04-18 PROCEDURE — 74011000250 HC RX REV CODE- 250: Performed by: INTERNAL MEDICINE

## 2022-04-18 RX ORDER — DIPHENHYDRAMINE HYDROCHLORIDE 50 MG/ML
25 INJECTION, SOLUTION INTRAMUSCULAR; INTRAVENOUS AS NEEDED
Start: 2023-04-16

## 2022-04-18 RX ORDER — ONDANSETRON 2 MG/ML
8 INJECTION INTRAMUSCULAR; INTRAVENOUS AS NEEDED
OUTPATIENT
Start: 2023-04-16

## 2022-04-18 RX ORDER — ZOLEDRONIC ACID 5 MG/100ML
5 INJECTION, SOLUTION INTRAVENOUS ONCE
Status: COMPLETED | OUTPATIENT
Start: 2022-04-18 | End: 2022-04-18

## 2022-04-18 RX ORDER — HYDROCORTISONE SODIUM SUCCINATE 100 MG/2ML
100 INJECTION, POWDER, FOR SOLUTION INTRAMUSCULAR; INTRAVENOUS AS NEEDED
OUTPATIENT
Start: 2023-04-16

## 2022-04-18 RX ORDER — ZOLEDRONIC ACID 5 MG/100ML
5 INJECTION, SOLUTION INTRAVENOUS ONCE
OUTPATIENT
Start: 2023-04-16 | End: 2023-04-16

## 2022-04-18 RX ORDER — ALBUTEROL SULFATE 0.83 MG/ML
2.5 SOLUTION RESPIRATORY (INHALATION) AS NEEDED
Start: 2023-04-16

## 2022-04-18 RX ORDER — SODIUM CHLORIDE 0.9 % (FLUSH) 0.9 %
10 SYRINGE (ML) INJECTION AS NEEDED
Status: DISPENSED | OUTPATIENT
Start: 2022-04-18 | End: 2022-04-18

## 2022-04-18 RX ORDER — SODIUM CHLORIDE 0.9 % (FLUSH) 0.9 %
10 SYRINGE (ML) INJECTION AS NEEDED
OUTPATIENT
Start: 2023-04-16

## 2022-04-18 RX ORDER — SODIUM CHLORIDE 9 MG/ML
10 INJECTION INTRAMUSCULAR; INTRAVENOUS; SUBCUTANEOUS AS NEEDED
OUTPATIENT
Start: 2023-04-16

## 2022-04-18 RX ORDER — HEPARIN 100 UNIT/ML
300-500 SYRINGE INTRAVENOUS AS NEEDED
Start: 2023-04-16

## 2022-04-18 RX ORDER — EPINEPHRINE 1 MG/ML
0.3 INJECTION, SOLUTION, CONCENTRATE INTRAVENOUS AS NEEDED
OUTPATIENT
Start: 2023-04-16

## 2022-04-18 RX ORDER — ACETAMINOPHEN 325 MG/1
650 TABLET ORAL AS NEEDED
Start: 2023-04-16

## 2022-04-18 RX ORDER — DIPHENHYDRAMINE HYDROCHLORIDE 50 MG/ML
50 INJECTION, SOLUTION INTRAMUSCULAR; INTRAVENOUS AS NEEDED
Start: 2023-04-16

## 2022-04-18 RX ORDER — SODIUM CHLORIDE 9 MG/ML
25 INJECTION, SOLUTION INTRAVENOUS CONTINUOUS
OUTPATIENT
Start: 2023-04-16

## 2022-04-18 RX ADMIN — SODIUM CHLORIDE, PRESERVATIVE FREE 10 ML: 5 INJECTION INTRAVENOUS at 12:01

## 2022-04-18 RX ADMIN — SODIUM CHLORIDE, PRESERVATIVE FREE 10 ML: 5 INJECTION INTRAVENOUS at 11:41

## 2022-04-18 RX ADMIN — ZOLEDRONIC ACID 5 MG: 5 INJECTION, SOLUTION INTRAVENOUS at 11:43

## 2022-04-18 NOTE — PROGRESS NOTES
[facility] Visit Note    Pt arrived to [facility] ambulatory in no acute distress at 1135 for Reclast.  Assessment unremarkable except fatigue. #24g PIV established in [facility] [place] without issue and positive blood return noted. Labs obtained on 3/28/22- Creatinine 0.91, Calcium 9.5, Calculated CrCl 41.6    Pt denies any recent or upcoming dental work. Patient denied having any symptoms of COVID-19, i.e. SOB, coughing, fever, or generally not feeling well. Also denies having been exposed to COVID-19 recently or having had any recent contact with family/friend that has a pending COVID test.     Visit Vitals  /64 (BP 1 Location: Left upper arm, BP Patient Position: Sitting)   Pulse 71   Temp 97.6 °F (36.4 °C)   Resp 18   Ht 5' 2\" (1.575 m)   Wt 53.9 kg (118 lb 14.4 oz)   SpO2 100%   Breastfeeding No   BMI 21.75 kg/m²       The following medications administered:  Reclast 5 mg IV over 15 minutes    Pt tolerated treatment well. No adverse reaction noted. IV flushed per policy and removed, 2x2 and coban placed. Pt discharged ambulatory in no acute distress at 1200, accompanied by daughter.   Next appointment to be determined after next bone density test per MD.

## 2022-05-10 ENCOUNTER — TELEPHONE (OUTPATIENT)
Dept: ENDOCRINOLOGY | Age: 82
End: 2022-05-10

## 2022-05-10 DIAGNOSIS — E23.2 DIABETES INSIPIDUS (HCC): Primary | ICD-10-CM

## 2022-05-10 NOTE — TELEPHONE ENCOUNTER
5/10/2022  12:32 pm      Pt called and stated  switched her from her desmopressin nasal spral to desmopressin tablets. Pt stated Serjio Cruz wanted her to get blood work after taking the tablets for a week. Pt stated she need a lab order. #[phone]      Thanks,  Jeremy Erazo

## 2022-05-10 NOTE — TELEPHONE ENCOUNTER
Please confirm she has been taking the tablets since January. If so, then the labs she had done at the end of March checked her sodium level and it was normal so I don't need any additional labs at this time and the tablets are working fine.

## 2022-05-11 NOTE — TELEPHONE ENCOUNTER
5/11/2022  11:58 AM      Pt called and stated she was returning Radha's call. #[phone]      Thanks,  Farnaz Rivera

## 2022-05-12 NOTE — TELEPHONE ENCOUNTER
If this is the case, then let her know, I put an order directly into the [facility] system to repeat your labs sometime next week so just ask for the order under my name and I will be in touch with the results.

## 2022-05-12 NOTE — TELEPHONE ENCOUNTER
5/12/2022  3:24 PM     Pt called and stated she is returning nurse abisai phone call.      Thanks,   Peggy Medina

## 2022-05-12 NOTE — TELEPHONE ENCOUNTER
Patient stated she just started taking the tablets on May 8th. Prior to that she was still on the nasal spray.

## 2022-05-19 LAB
BUN SERPL-MCNC: 15 MG/DL (ref 8–27)
BUN/CREAT SERPL: 17 (ref 12–28)
CALCIUM SERPL-MCNC: 9.5 MG/DL (ref 8.7–10.3)
CHLORIDE SERPL-SCNC: 103 MMOL/L (ref 96–106)
CO2 SERPL-SCNC: 26 MMOL/L (ref 20–29)
CREAT SERPL-MCNC: 0.86 MG/DL (ref 0.57–1)
EGFR: 68 ML/MIN/1.73
GLUCOSE SERPL-MCNC: 93 MG/DL (ref 65–99)
POTASSIUM SERPL-SCNC: 4 MMOL/L (ref 3.5–5.2)
SODIUM SERPL-SCNC: 143 MMOL/L (ref 134–144)

## 2022-06-01 RX ORDER — LEVOTHYROXINE SODIUM 75 UG/1
TABLET ORAL
Qty: 90 TABLET | Refills: 3 | Status: SHIPPED | OUTPATIENT
Start: 2022-06-01

## 2022-08-09 DIAGNOSIS — E23.6 PITUITARY CYST (HCC): ICD-10-CM

## 2022-08-09 DIAGNOSIS — E03.8 SECONDARY HYPOTHYROIDISM: ICD-10-CM

## 2022-08-09 DIAGNOSIS — E23.2 DIABETES INSIPIDUS (HCC): ICD-10-CM

## 2022-08-09 DIAGNOSIS — E55.9 VITAMIN D DEFICIENCY: ICD-10-CM

## 2022-08-09 DIAGNOSIS — M81.0 AGE-RELATED OSTEOPOROSIS WITHOUT CURRENT PATHOLOGICAL FRACTURE: ICD-10-CM

## 2022-08-09 DIAGNOSIS — E27.40 ADRENAL INSUFFICIENCY (HCC): ICD-10-CM

## 2022-08-16 LAB
BUN SERPL-MCNC: 20 MG/DL (ref 8–27)
BUN/CREAT SERPL: 20 (ref 12–28)
CALCIUM SERPL-MCNC: 10.1 MG/DL (ref 8.7–10.3)
CHLORIDE SERPL-SCNC: 103 MMOL/L (ref 96–106)
CO2 SERPL-SCNC: 23 MMOL/L (ref 20–29)
CREAT SERPL-MCNC: 1.01 MG/DL (ref 0.57–1)
EGFR: 56 ML/MIN/1.73
GLUCOSE SERPL-MCNC: 101 MG/DL (ref 65–99)
INTERPRETATION: NORMAL
POTASSIUM SERPL-SCNC: 4 MMOL/L (ref 3.5–5.2)
PROLACTIN SERPL-MCNC: <0.1 NG/ML (ref 4.8–23.3)
SODIUM SERPL-SCNC: 142 MMOL/L (ref 134–144)
T4 FREE SERPL-MCNC: 1.61 NG/DL (ref 0.82–1.77)

## 2022-08-22 ENCOUNTER — OFFICE VISIT (OUTPATIENT)
Dept: ENDOCRINOLOGY | Age: 82
End: 2022-08-22
Payer: MEDICARE

## 2022-08-22 VITALS
BODY MASS INDEX: 21.68 KG/M2 | SYSTOLIC BLOOD PRESSURE: 172 MMHG | HEART RATE: 79 BPM | DIASTOLIC BLOOD PRESSURE: 80 MMHG | HEIGHT: 62 IN | WEIGHT: 117.8 LBS

## 2022-08-22 DIAGNOSIS — E23.2 DIABETES INSIPIDUS (HCC): ICD-10-CM

## 2022-08-22 DIAGNOSIS — E23.6 PITUITARY CYST (HCC): Primary | ICD-10-CM

## 2022-08-22 DIAGNOSIS — M81.0 AGE-RELATED OSTEOPOROSIS WITHOUT CURRENT PATHOLOGICAL FRACTURE: ICD-10-CM

## 2022-08-22 DIAGNOSIS — E55.9 VITAMIN D DEFICIENCY: ICD-10-CM

## 2022-08-22 DIAGNOSIS — E27.40 ADRENAL INSUFFICIENCY (HCC): ICD-10-CM

## 2022-08-22 DIAGNOSIS — E03.8 SECONDARY HYPOTHYROIDISM: ICD-10-CM

## 2022-08-22 PROCEDURE — 99214 OFFICE O/P EST MOD 30 MIN: CPT | Performed by: INTERNAL MEDICINE

## 2022-08-22 PROCEDURE — G8420 CALC BMI NORM PARAMETERS: HCPCS | Performed by: INTERNAL MEDICINE

## 2022-08-22 PROCEDURE — G8427 DOCREV CUR MEDS BY ELIG CLIN: HCPCS | Performed by: INTERNAL MEDICINE

## 2022-08-22 PROCEDURE — G8432 DEP SCR NOT DOC, RNG: HCPCS | Performed by: INTERNAL MEDICINE

## 2022-08-22 PROCEDURE — 1101F PT FALLS ASSESS-DOCD LE1/YR: CPT | Performed by: INTERNAL MEDICINE

## 2022-08-22 PROCEDURE — 1123F ACP DISCUSS/DSCN MKR DOCD: CPT | Performed by: INTERNAL MEDICINE

## 2022-08-22 PROCEDURE — 1090F PRES/ABSN URINE INCON ASSESS: CPT | Performed by: INTERNAL MEDICINE

## 2022-08-22 PROCEDURE — G8536 NO DOC ELDER MAL SCRN: HCPCS | Performed by: INTERNAL MEDICINE

## 2022-08-22 RX ORDER — ATENOLOL 25 MG/1
25 TABLET ORAL DAILY
COMMUNITY

## 2022-08-22 NOTE — PROGRESS NOTES
Chief Complaint   Patient presents with    Thyroid Problem     PCP and pharmacy confirmed       History of Present Illness: Shelia Pack is a [de-identified] y.o. female here for follow up of thyroid. Sometime after last visit in 8/21, she stopped the atenolol as her blood pressure was going too low at times and apparently it was fine at her cardiology visit last fall and has stayed off this. Due to see Monna  with cardiology this fall. It was recorded as 109/64 at her Reclast infusion in 4/22. Has been off the DDAVP spray since the winter and takes 1 tab at bedtime. May still get up once at night to urinate but difference than on the spray and doesn't feel more thirsty. Still taking cabergoline 1 tab 2x/week and her levothyroxine is at bedtime. Still taking HC 10/5 daily. Can have some intermittent dizziness and palpitations and unclear if this could be worse off the atenolol. Tolerated the reclast infusion in 4/22 without side effects. No falls or fractures. Still taking vitamin D and calcium daily and the calcium is during the day. No new headaches or change in vision. Hasn't had a repeat scan with Dr. Kit Rivers since 2018 so advised her to contact his office to schedule this. Current Outpatient Medications   Medication Sig    levothyroxine (SYNTHROID) 75 mcg tablet TAKE 1 TABLET EVERY DAY IN THE MORNING  BEFORE  BREAKFAST    cabergoline (DOSTINEX) 0.5 mg tablet TAKE 1 TABLET TWICE WEEKLY    desmopressin (DDAVP) 0.1 mg tablet Take 1 Tablet by mouth nightly. Replaces nasal spray    hydrocortisone (CORTEF) 5 mg tablet TAKE 2 TABLETS EVERY MORNING  AND TAKE 1 TABLET  BETWEEN 4 AND 5 PM    flecainide (TAMBOCOR) 50 mg tablet Take 50 mg by mouth two (2) times a day. furosemide (LASIX) 20 mg tablet Take 20 mg by mouth daily. acetaminophen (TYLENOL) 500 mg tablet Take  by mouth as needed for Pain. cholecalciferol, vitamin D3, 50 mcg (2,000 unit) tab Take  by mouth daily.       CALCIUM CARBONATE/VITAMIN D3 (CALCIUM 600 + D,3, PO) Take  by mouth daily. aspirin 81 mg tablet Take 81 mg by mouth daily. No current facility-administered medications for this visit. Allergies   Allergen Reactions    Amoxicillin Hives     Review of Systems: PER HPI    Physical Examination:  Blood pressure (!) 185/76, pulse 79, height 5' 2\" (1.575 m), weight 117 lb 12.8 oz (53.4 kg). --Repeat manually 172/80 in left arm. General: pleasant, no distress, good eye contact   Neck: no carotid bruits  Cardiovascular: regular, normal rate, nl s1 and s2, no m/r/g   Respiratory: clear to auscultation bilaterally   Integumentary: skin is normal, no edema  Neurological: reflexes 2+ at biceps,   Psychiatric: normal mood and affect    Data Reviewed:   Component      Latest Ref Rng & Units 8/15/2022 8/15/2022 8/15/2022           9:54 AM  9:54 AM  9:54 AM   Glucose      65 - 99 mg/dL  101 (H)    BUN      8 - 27 mg/dL  20    Creatinine      0.57 - 1.00 mg/dL  1.01 (H)    eGFR      >59 mL/min/1.73  56 (L)    BUN/Creatinine ratio      12 - 28  20    Sodium      134 - 144 mmol/L  142    Potassium      3.5 - 5.2 mmol/L  4.0    Chloride      96 - 106 mmol/L  103    CO2      20 - 29 mmol/L  23    Calcium      8.7 - 10.3 mg/dL  10.1    T4, Free      0.82 - 1.77 ng/dL   1.61   Prolactin      4.8 - 23.3 ng/mL <0.1 (L)         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 Ingrid Powell. She had visual field disturbance prior to surgery but per pt, this was normal in September 2012 with Dr. Zach Niño. She has had repeat MRI with Dr. Ingrid Powell in September 2012 and the cyst is still present. She did have a prolactin of 52.8 prior to surgery and repeat was still elevated at 53 in March. Her level was higher at 78 in April and 71 in July and 80 in October.   I discussed with Dr. Ingrid Powell about a trial of cabergoline 0.5 mg 2x/week and this was started in October and her prolactin level was <.0.1 in Jan 2013. I decreased her dose to 1/2 tab 2x/week. I spoke with Dr. Alena Childress at that time and we agreed to hold on repeating an MRI until another 3 months of treatment to see if we can keep her prolactin level in the normal or below normal range to hopefully shrink any residual tissue. In April 2013, her level was still undetectable so she went for repeat MRI in 5/13 that showed the volume of her pituitary mass had decreased from 1.3x1.4x0.9 cm = 1.63 cubic centimers to 0.9x1.16x0.8 cm= 0.84 cubic centimeters which is about a 50% decrease in volume. However in 6/14 her MRI showed her cyst to be 0.9x1. 3x0.9 cm = 1.05 cubic centimeters and even though the report said this was stable, given this was a slight increase from the prior year I decided to increase her dose back to a whole tablet 2x/week to try and further shrink her cyst.  It remained at 0.9x1. 3x0.9 cm in 8/15 and plan per Dr. Alena Childress was for repeat MRI in 2 years and apparently had this done in fall of 2017 and July 2018 and also had a CT scan in 1/18. Dr. Alena Childress sent me a note in 10/18 that said he reviewed all of her imaging and there has not been any significant change in her size of her cyst and recommended she come back in the fall of 2019 but has not done this and advised her to do this now. Her prolactin has remained undetectable since 1/13.    - cont cabergoline whole 0.5 mg tab 2x/week   - check prolactin prior to next visit  - f/u with Dr. Oren Caldwell for visual field testing as needed in the future (last done in 8/18 and was normal)         2. Diabetes insipidus: she developed this post-op and was doing well taking 1 spray at bedtime  When she tried to taper to 1 spray every other night, had increase in her urine output and thirst so likely this is permanent and had her stay at 1 spray at bedtime until winter 2022 when her insurance requested changing to DDAVT tablets and has done well with 0.1 mg at bedtime.  Her sodium level has been normal on this dose. - check bmp prior to next visit  - cont DDAVP 0.1 mg at bedtime every night        3. Secondary hypothyroidism: was started on Synthroid 50 mcg daily based on a low total T4 of 3.1 with an inappropriately normal TSH of 0.777 in January 2012 prior to her surgery. I wasn't sure she truly had hypothyroidism that would necessitate life long treatment. Her repeat labs in March on this dose showed a low TSH of 0.097 with a normal free T4 of 1.07. I had her stop the dose in March and off treatment she didn't feel much different but her free T4 was slightly low at 0.8 and her TSH was normal at 1.43 in April. Given that she was not symptomatic, I held on restarting treatment but her free T4 was lower at 0.75 in July 2012 and she was having more fatigue so I restarted this and she has felt better. Free T4 normal at 1.07 in Jan 2013 with a low TSH of 0.038. I will just follow the free T4 as the TSH is likely to be low in someone with a history of pituitary surgery and repeat free T4 was 1.0 in April 2013 and 1.08 in 8/13 and 1.1 in 2/14. Down to 1.04 in 8/14 and given she had some fatigue and weight gain, I increased her dose to 75 mcg daily to see if this helps and it has and free T4 was 1.33 in 2/15 and 1.43 in 8/15 and 1.29 in 8/16 and 1.53 in 8/17 and 1.3 in 8/18 and 1.49 in 8/19. Down to 1.18 in 8/20 but felt well so kept dose the same and up to 1.56 in 8/21 and 1.61 in 8/22 so kept dose the same. - cont levothyroxine 75 mcg daily at bedtime  - check free T4 prior to next visit        4. Adrenal insufficiency: She was started on hydrocortisone 20 mg in am and 10 mg in pm after surgery. Her electrolytes have been fine so I had hoped she would not need long term steroids, especially given her history of osteoporosis. I decreased her to 15/5 in March but her morning cortisol was 2.8 in April after holding her pm dose.   Her level was up to 6.6 in July so I cut back to 10/5 but her morning cortisol was 1.1 in October 2012 so she will definitely need this long term. Will not continue to check cortisol levels in the future. Advised her to get a medic alert bracelet and she has done this. Previously advised her to double her dose of HC in times of stress or infection in the future and again reminded her today  - cont HC 10 mg in am and 5 mg in pm  - wear medic alert bracelet        5. Osteoporosis:  Her vitamin D level was slightly low at 24 in April 2012 and after taking 2000 units/day it was up to 35.6 in July and 31.7 in October and 33.5 in 4/13 and 40 in 2/14 and 50 in 2/15 and 40 in 8/15 and 36.3 in 8/16 and 48 in 8/17 and 34 in 8/20. She had a DXA in 11/10 that showed a T-score of -3.0 at the L-spine, 0.0 at the wrist and -2.2 at the left femoral neck. She had been on treatment for about 10 years with one break of undetermined duration. I repeated a scan in May 2013. This showed a significant 13.5% increase in total hip compared to 2010 so I stopped the fosamax at that time and repeated a bone density scan in 7/15 which showed a 3.7% decrease at her right total hip and 1.4% decrease at her left total hip but both locations are in the osteopenic range. Her spine cannot be accurately assessed due to scoliosis so do not feel the 3.6% decrease as listed above is accurate. We decided to hold on any new meds at this time and simply repeat her DXA in 2 years. This showed her spine had a 1.8% decrease and her hip had a 2.1% decrease but both were not significant and we agreed to hold on any treatment for another 2 years and focus on increasing her weight bearing activity through walking.   Repeat DXA in 8/19 was done on a different machine so a reliable comparison cannot be made but her T-score is worse at her left hip and wrist and FRAX score is 27% for MOP fracture and 11% for hip fracture which are both above treatment threshold so I recommended either prolia or reclast in 8/19 and she wanted to think about these and never contacted me but did agree to have this after her visit in 8/20 and received her 1st dose in Jan 2021 and 2nd in 4/22. Will repeat her DXA now to determine whether to give another dose in the spring of 2023. Given her calcium level was 10.4 in 2/15, I cut back on calcium to once daily and level has been normal since. - cont calcium once daily  - check Vitamin D 25-OH level as needed (DX E55.9)  - cont vitamin D 2000 units daily   - order repeat DXA now        Patient Instructions   1) Please reach out to Dr. Sutton [facility] office about getting another scan of your pituitary as the last one that I have on file was from 2018.    2) Your prolactin is appropriately low so keep taking cabergoline 1 tab 2 times a week. 3) Your free T4 (thyroid test) is perfect so I will keep your dose the same of levothyroxine. 4) Your sodium and potassium are normal so the DDAVP tabs and hydrocortisone dose look good. 5) Your creatinine (kidney test) was slightly high due to mild dehydration. Drink at least 4 8oz glasses of water everyday to stay hydrated to prevent your creatinine level from going higher. 6) I would restart the atenolol 25 mg tab to take 1 tab daily. Keep track of your blood pressure and if you have a reading under 105 on the top number, then you can hold your dose that day. 7) I placed an order for your repeat bone density scan. You should receive a call to have this scheduled. However, if you have not heard from the  in the next few days, please call the Patient Care team at [phone] to schedule this appointment at your earliest convenience and I'll be in touch with the results. Follow-up and Dispositions    Return in about 1 year (around 8/22/2023).                Copy sent to:  Dr. Abril Alarcon, NP    Addendum: 09/29/22    Sent her the following message in a letter and had my my nurse/MA call her with her results:    Resulted Orders   DEXA BONE DENSITY STUDY AXIAL    Narrative    Bone Mineral Density    Indication: Osteoporosis. Age: [de-identified]  Sex: Female. Menopause status: postmenopausal.. Hormone replacement therapy: None. Number of falls in the past year: None. .  Risk factors for osteoporosis: None. .    Current medication for osteoporosis: Calcium and vitamin D. Comparison: 2017    Technique: Imaging was performed on the [facility]. Excluded sites: 0    Findings:    Fractures identified on Lateral scanogram: Thoracic spine not well assessed  because of the spondylitic change    Femoral Neck Left: . Bone mineral density (gm/cm2): 0.656 g/cm? .  % of peak bone mass: 63.0%.  % for age matched controls: 94.0%. T-score: -2.7 .  Z-score: -0.3 . Total Hip Left: . Bone mineral density (gm/cm2): 0.653 g/cm? .  % of peak bone mass: 65.0%.  % for age matched controls: 92.0%. T-score: -2.8 .  Z-score: -0.5 . Lumbar Spine: L1-4 or specify. Bone mineral density (gm/cm2): 0.872 g/cm? .  % of peak bone mass: 73.0%.  % for age matched controls: 95.0%. T-score: -2.6 .  Z-score: -0.3 .    33% Radius Left: . Bone mineral density (gm/cm2): 0.599 g/cm? .  % of peak bone mass: 68.0%.  % for age matched controls:  95.0%. T-score: -3.2 . Z-score: -0.4 . Impression    This patient is osteoporotic using the World Health Organization criteria  As compared to the prior study, there has been a trend toward an increase in the  forearm. .    Recommendations:  Therapy recommendations need to be tailored to each individual patient. Please reconsider testing based on risk factors. Currently, Medicare will only  reimburse for a central DXA examination every two years, unless the patient is  on chronic glucocorticoid therapy. Note: Please note that reliable, valid comparisons cannot be made between  studies which have been performed on machines from different manufacturers.  If  clinically warranted, a follow up study performed at this site, on the same  unit, would allow the most sensitive assessment of change in bone mineral  density. T-score is your bone density compared to a 27year old and the more negative a number, the weaker the bone density and the less negative a number or more positive a number, the stronger the bones. Osteoporosis (severe bone loss) is any T-score -2.5 or lower (more negative). Osteopenia (mild bone loss) is any T-score between -1.0 and -2.5. Normal bone density is any T-score -1.0 or higher (more positive). -------------------------------------------------------------------------------------------------------------------  Your T-score is -2.8 at the left hip, which is in the osteoporosis range and slightly worse than -2.6 in 2019. Your T-score is -2.6 at the spine, which is in the osteoporosis range and slightly worse than -2.3 in 2019. Your T-score is -3.2 at the left wrist, which is in the osteoporosis range but has improved from -4.2 in 2019. Given all 3 sites are still in the osteoporosis range, I recommend you receive another dose of Reclast in April 2023 and in April 2024 and then we will repeat another bone density scan in the fall of 2024. The infusion center should contact you to set up a date and time for this in April 2023 but if you haven't heard from them, please call [phone] for [facility] to get this scheduled. Thanks.

## 2022-08-22 NOTE — PATIENT INSTRUCTIONS
1) Please reach out to Dr. Warren Plummer office about getting another scan of your pituitary as the last one that I have on file was from 2018.    2) Your prolactin is appropriately low so keep taking cabergoline 1 tab 2 times a week. 3) Your free T4 (thyroid test) is perfect so I will keep your dose the same of levothyroxine. 4) Your sodium and potassium are normal so the DDAVP tabs and hydrocortisone dose look good. 5) Your creatinine (kidney test) was slightly high due to mild dehydration. Drink at least 4 8oz glasses of water everyday to stay hydrated to prevent your creatinine level from going higher. 6) I would restart the atenolol 25 mg tab to take 1 tab daily. Keep track of your blood pressure and if you have a reading under 105 on the top number, then you can hold your dose that day. 7) I placed an order for your repeat bone density scan. You should receive a call to have this scheduled. However, if you have not heard from the  in the next few days, please call the Patient Care team at [phone] to schedule this appointment at your earliest convenience and I'll be in touch with the results.

## 2022-09-01 ENCOUNTER — TELEPHONE (OUTPATIENT)
Dept: ENDOCRINOLOGY | Age: 82
End: 2022-09-01

## 2022-09-01 DIAGNOSIS — M81.0 AGE-RELATED OSTEOPOROSIS WITHOUT CURRENT PATHOLOGICAL FRACTURE: Primary | ICD-10-CM

## 2022-09-01 NOTE — TELEPHONE ENCOUNTER
9/1/2022    Pt called and left a vm at 11:34 am stating she called to schedule her bone density scan and was adv they do not have an order in from Dr. Kamla Schumacher. Pt can be reached at [phone].     Thanks,   Rachelle Curiel

## 2022-09-01 NOTE — TELEPHONE ENCOUNTER
Called and spoke with pt. I apologized that I inadvertently forgot to place the order for her scan on the day of her appointment but I put it in now so she can call the patient care team to schedule this at her convenience and I'll be in touch with the results. She has the phone number to call to schedule this. she voiced understanding of this plan.

## 2022-09-06 RX ORDER — HYDROCORTISONE 5 MG/1
TABLET ORAL
Qty: 270 TABLET | Refills: 3 | Status: SHIPPED | OUTPATIENT
Start: 2022-09-06

## 2022-09-26 ENCOUNTER — HOSPITAL ENCOUNTER (OUTPATIENT)
Dept: BONE DENSITY | Age: 82
Discharge: HOME | End: 2022-09-26
Attending: INTERNAL MEDICINE
Payer: MEDICARE

## 2022-09-26 DIAGNOSIS — M81.0 AGE-RELATED OSTEOPOROSIS WITHOUT CURRENT PATHOLOGICAL FRACTURE: ICD-10-CM

## 2022-09-26 PROCEDURE — 77080 DXA BONE DENSITY AXIAL: CPT

## 2022-09-29 ENCOUNTER — TELEPHONE (OUTPATIENT)
Dept: ENDOCRINOLOGY | Age: 82
End: 2022-09-29

## 2022-09-29 NOTE — TELEPHONE ENCOUNTER
Please call her with her results and let her know I sent her the following message in a letter and in [place] in case she checks this:    T-score is your bone density compared to a 27year old and the more negative a number, the weaker the bone density and the less negative a number or more positive a number, the stronger the bones. Osteoporosis (severe bone loss) is any T-score -2.5 or lower (more negative). Osteopenia (mild bone loss) is any T-score between -1.0 and -2.5. Normal bone density is any T-score -1.0 or higher (more positive). -------------------------------------------------------------------------------------------------------------------  Your T-score is -2.8 at the left hip, which is in the osteoporosis range and slightly worse than -2.6 in 2019. Your T-score is -2.6 at the spine, which is in the osteoporosis range and slightly worse than -2.3 in 2019. Your T-score is -3.2 at the left wrist, which is in the osteoporosis range but has improved from -4.2 in 2019. Given all 3 sites are still in the osteoporosis range, I recommend you receive another dose of Reclast in April 2023 and in April 2024 and then we will repeat another bone density scan in the fall of 2024. The infusion center should contact you to set up a date and time for this in April 2023 but if you haven't heard from them, please call [phone] for [facility] to get this scheduled. Thanks.

## 2022-09-30 NOTE — TELEPHONE ENCOUNTER
Patient notified of message per Dr. Epifanio Garcia and voiced understanding of what was read to them. Pt states she will look out for the letter and will call to schedule Reclast April 2023.

## 2022-10-31 RX ORDER — DESMOPRESSIN ACETATE 0.1 MG/1
0.1 TABLET ORAL
Qty: 90 TABLET | Refills: 3 | Status: SHIPPED | OUTPATIENT
Start: 2022-10-31

## 2023-01-12 DIAGNOSIS — M81.0 AGE-RELATED OSTEOPOROSIS WITHOUT CURRENT PATHOLOGICAL FRACTURE: Primary | ICD-10-CM

## 2023-02-06 RX ORDER — CABERGOLINE 0.5 MG/1
TABLET ORAL
Qty: 24 TABLET | Refills: 3 | Status: SHIPPED | OUTPATIENT
Start: 2023-02-06

## 2023-02-28 ENCOUNTER — TRANSCRIBE ORDER (OUTPATIENT)
Dept: SCHEDULING | Age: 83
End: 2023-02-28

## 2023-02-28 DIAGNOSIS — D49.7 PITUITARY TUMOR: Primary | ICD-10-CM

## 2023-04-17 ENCOUNTER — HOSPITAL ENCOUNTER (OUTPATIENT)
Dept: CT IMAGING | Age: 83
Discharge: HOME | End: 2023-04-17
Attending: NEUROLOGICAL SURGERY

## 2023-04-17 ENCOUNTER — TRANSCRIBE ORDER (OUTPATIENT)
Dept: SCHEDULING | Age: 83
End: 2023-04-17

## 2023-04-17 DIAGNOSIS — D49.7 PITUITARY TUMOR: Primary | ICD-10-CM

## 2023-04-17 DIAGNOSIS — D49.7 PITUITARY TUMOR: ICD-10-CM

## 2023-05-08 ENCOUNTER — HOSPITAL ENCOUNTER (OUTPATIENT)
Facility: HOSPITAL | Age: 83
Discharge: HOME | End: 2023-05-11
Payer: MEDICARE

## 2023-05-08 DIAGNOSIS — D49.7 PITUITARY TUMOR: ICD-10-CM

## 2023-05-08 PROCEDURE — 6360000004 HC RX CONTRAST MEDICATION: Performed by: NEUROLOGICAL SURGERY

## 2023-05-08 PROCEDURE — A9579 GAD-BASE MR CONTRAST NOS,1ML: HCPCS | Performed by: NEUROLOGICAL SURGERY

## 2023-05-08 PROCEDURE — 70553 MRI BRAIN STEM W/O & W/DYE: CPT

## 2023-05-08 RX ADMIN — GADOTERIDOL 11 ML: 279.3 INJECTION, SOLUTION INTRAVENOUS at 16:13

## 2023-06-19 RX ORDER — LEVOTHYROXINE SODIUM 0.07 MG/1
TABLET ORAL
Qty: 90 TABLET | Refills: 3 | Status: SHIPPED | OUTPATIENT
Start: 2023-06-19

## 2023-06-22 ENCOUNTER — TELEPHONE (OUTPATIENT)
Age: 83
End: 2023-06-22

## 2023-08-04 DIAGNOSIS — E03.8 SECONDARY HYPOTHYROIDISM: ICD-10-CM

## 2023-08-04 DIAGNOSIS — E27.40 ADRENAL INSUFFICIENCY (HCC): ICD-10-CM

## 2023-08-04 DIAGNOSIS — E23.2 DIABETES INSIPIDUS (HCC): ICD-10-CM

## 2023-08-04 DIAGNOSIS — E23.6 PITUITARY CYST (HCC): ICD-10-CM

## 2023-08-04 DIAGNOSIS — M81.0 AGE-RELATED OSTEOPOROSIS WITHOUT CURRENT PATHOLOGICAL FRACTURE: ICD-10-CM

## 2023-08-04 DIAGNOSIS — E55.9 VITAMIN D DEFICIENCY: ICD-10-CM

## 2023-08-15 LAB
BUN SERPL-MCNC: 20 MG/DL (ref 8–27)
BUN/CREAT SERPL: 22 (ref 12–28)
CALCIUM SERPL-MCNC: 9.5 MG/DL (ref 8.7–10.3)
CHLORIDE SERPL-SCNC: 97 MMOL/L (ref 96–106)
CO2 SERPL-SCNC: 24 MMOL/L (ref 20–29)
CREAT SERPL-MCNC: 0.89 MG/DL (ref 0.57–1)
EGFRCR SERPLBLD CKD-EPI 2021: 65 ML/MIN/1.73
GLUCOSE SERPL-MCNC: 94 MG/DL (ref 70–99)
POTASSIUM SERPL-SCNC: 4.2 MMOL/L (ref 3.5–5.2)
PROLACTIN SERPL-MCNC: <0.1 NG/ML (ref 4.8–23.3)
SODIUM SERPL-SCNC: 137 MMOL/L (ref 134–144)
T4 FREE SERPL-MCNC: 1.37 NG/DL (ref 0.82–1.77)

## 2023-08-21 ENCOUNTER — OFFICE VISIT (OUTPATIENT)
Age: 83
End: 2023-08-21
Payer: COMMERCIAL

## 2023-08-21 VITALS
DIASTOLIC BLOOD PRESSURE: 68 MMHG | HEIGHT: 62 IN | SYSTOLIC BLOOD PRESSURE: 122 MMHG | WEIGHT: 117.8 LBS | HEART RATE: 73 BPM | BODY MASS INDEX: 21.68 KG/M2

## 2023-08-21 DIAGNOSIS — M81.0 AGE-RELATED OSTEOPOROSIS WITHOUT CURRENT PATHOLOGICAL FRACTURE: ICD-10-CM

## 2023-08-21 DIAGNOSIS — E27.40 ADRENAL INSUFFICIENCY (HCC): ICD-10-CM

## 2023-08-21 DIAGNOSIS — E23.6 PITUITARY CYST (HCC): Primary | ICD-10-CM

## 2023-08-21 DIAGNOSIS — E03.8 SECONDARY HYPOTHYROIDISM: ICD-10-CM

## 2023-08-21 DIAGNOSIS — E23.2 DIABETES INSIPIDUS (HCC): ICD-10-CM

## 2023-08-21 PROCEDURE — 1036F TOBACCO NON-USER: CPT | Performed by: INTERNAL MEDICINE

## 2023-08-21 PROCEDURE — 99214 OFFICE O/P EST MOD 30 MIN: CPT | Performed by: INTERNAL MEDICINE

## 2023-08-21 PROCEDURE — 1123F ACP DISCUSS/DSCN MKR DOCD: CPT | Performed by: INTERNAL MEDICINE

## 2023-08-21 PROCEDURE — G8420 CALC BMI NORM PARAMETERS: HCPCS | Performed by: INTERNAL MEDICINE

## 2023-08-21 PROCEDURE — G8427 DOCREV CUR MEDS BY ELIG CLIN: HCPCS | Performed by: INTERNAL MEDICINE

## 2023-08-21 PROCEDURE — G8399 PT W/DXA RESULTS DOCUMENT: HCPCS | Performed by: INTERNAL MEDICINE

## 2023-08-21 PROCEDURE — 1090F PRES/ABSN URINE INCON ASSESS: CPT | Performed by: INTERNAL MEDICINE

## 2023-08-21 RX ORDER — DIPHENHYDRAMINE HYDROCHLORIDE 50 MG/ML
50 INJECTION INTRAMUSCULAR; INTRAVENOUS
OUTPATIENT
Start: 2023-08-21

## 2023-08-21 RX ORDER — ZOLEDRONIC ACID 5 MG/100ML
5 INJECTION, SOLUTION INTRAVENOUS ONCE
OUTPATIENT
Start: 2023-08-21 | End: 2023-08-21

## 2023-08-21 RX ORDER — SODIUM CHLORIDE 9 MG/ML
INJECTION, SOLUTION INTRAVENOUS CONTINUOUS
OUTPATIENT
Start: 2023-08-21

## 2023-08-21 RX ORDER — ACETAMINOPHEN 325 MG/1
650 TABLET ORAL
OUTPATIENT
Start: 2023-08-21

## 2023-08-21 RX ORDER — ALBUTEROL SULFATE 90 UG/1
4 AEROSOL, METERED RESPIRATORY (INHALATION) PRN
OUTPATIENT
Start: 2023-08-21

## 2023-08-21 RX ORDER — ONDANSETRON 2 MG/ML
8 INJECTION INTRAMUSCULAR; INTRAVENOUS
OUTPATIENT
Start: 2023-08-21

## 2023-08-21 RX ORDER — FAMOTIDINE 10 MG/ML
20 INJECTION, SOLUTION INTRAVENOUS
OUTPATIENT
Start: 2023-08-21

## 2023-08-21 RX ORDER — HEPARIN SODIUM (PORCINE) LOCK FLUSH IV SOLN 100 UNIT/ML 100 UNIT/ML
500 SOLUTION INTRAVENOUS PRN
OUTPATIENT
Start: 2023-08-21

## 2023-08-21 RX ORDER — EPINEPHRINE 1 MG/ML
0.3 INJECTION, SOLUTION, CONCENTRATE INTRAVENOUS PRN
OUTPATIENT
Start: 2023-08-21

## 2023-08-21 RX ORDER — SODIUM CHLORIDE 9 MG/ML
5-250 INJECTION, SOLUTION INTRAVENOUS PRN
OUTPATIENT
Start: 2023-08-21

## 2023-08-21 RX ORDER — SODIUM CHLORIDE 0.9 % (FLUSH) 0.9 %
5-40 SYRINGE (ML) INJECTION PRN
OUTPATIENT
Start: 2023-08-21

## 2023-08-21 NOTE — PROGRESS NOTES
Chief Complaint   Patient presents with    Thyroid Problem     PCP and pharmacy confirmed      History of Present Illness: Adriana Johnson is a 81 y.o. female here for follow up of thyroid. Weight stable since last visit in 8/22. Her daughter-in-law, Ector Cortez, is here with her today. No falls or fractures over the past year. Did have another brain MRI in 5/23 and saw Dr. Bone All after this was done and he told her that no further imaging is needed. Still gets the cabergoline 1 tab 2x/week on Mon and Thurs. No new headaches or change vision or dizziness. Still getting DDAVP 1 tab at bedtime and no excess thirst or urination. Still taking HC 2 tabs in am and 1 tab at 5pm.  Did restart the atenolol after last visit but Dr. Sandra Zheng was concerned her pulse was too low and he stopped this and her BP was fine today off this. Current Outpatient Medications   Medication Sig    levothyroxine (SYNTHROID) 75 MCG tablet TAKE 1 TABLET EVERY DAY IN THE MORNING  BEFORE  BREAKFAST    acetaminophen (TYLENOL) 500 MG tablet Take by mouth as needed    cabergoline (DOSTINEX) 0.5 MG tablet TAKE 1 TABLET TWICE WEEKLY    Cholecalciferol 50 MCG (2000 UT) TABS Take by mouth daily    desmopressin (DDAVP) 0.1 MG tablet Take 1 tablet by mouth    flecainide (TAMBOCOR) 50 MG tablet Take 1 tablet by mouth 2 times daily    furosemide (LASIX) 20 MG tablet Take 1 tablet by mouth daily    hydrocortisone (CORTEF) 5 MG tablet TAKE 2 TABLETS EVERY MORNING  AND TAKE 1 TABLET  BETWEEN 4 AND 5 PM     No current facility-administered medications for this visit. Allergies   Allergen Reactions    Amoxicillin Hives     Review of Systems: PER HPI    Physical Examination:  Blood pressure 122/68, pulse 73, height 5' 2\" (1.575 m), weight 117 lb 12.8 oz (53.4 kg).   General: pleasant, no distress, good eye contact   Neck: no carotid bruits  Cardiovascular: regular, normal rate, nl s1 and s2, no m/r/g   Respiratory: clear to auscultation

## 2023-08-21 NOTE — PATIENT INSTRUCTIONS
1) I have placed the order for reclast so the infusion center should be able to see this and someone should contact you within the next 2 business days to schedule your appointment. If you have not heard from them by then, please call [phone] for [facility] to get this scheduled. Reclast is a once a year IV infusion to prevent fracture. This is given at the infusion center and involves having an IV placed and receiving an infusion over about 15 minutes. Side effects are a possible flu-like reaction with low grade fevers and chills and body aches over the 24-48 hours after the infusion which can occur in 5-10% of patients. If this occurs, you can take Tylenol or motrin for these symptoms. Please be sure to drink at least 32 oz of water the day before the infusion, the day of the infusion, and the day after the infusion. 2) BUN and creatinine are markers of kidney function. Your values are normal.    3) Your electrolytes (sodium, potassium, and calcium) are all normal.  Your glucose (blood sugar) is normal.    4) Your free T4 (thyroid test) is normal.    5) Your prolactin is appropriately undetectable so your cabergoline is working well.

## 2023-09-18 ENCOUNTER — HOSPITAL ENCOUNTER (OUTPATIENT)
Facility: HOSPITAL | Age: 83
Setting detail: INFUSION SERIES
End: 2023-09-18
Payer: MEDICARE

## 2023-09-18 VITALS
HEART RATE: 70 BPM | SYSTOLIC BLOOD PRESSURE: 135 MMHG | OXYGEN SATURATION: 98 % | WEIGHT: 118 LBS | HEIGHT: 62 IN | BODY MASS INDEX: 21.71 KG/M2 | TEMPERATURE: 98.1 F | RESPIRATION RATE: 16 BRPM | DIASTOLIC BLOOD PRESSURE: 73 MMHG

## 2023-09-18 DIAGNOSIS — M81.0 AGE-RELATED OSTEOPOROSIS WITHOUT CURRENT PATHOLOGICAL FRACTURE: Primary | ICD-10-CM

## 2023-09-18 PROCEDURE — 96374 THER/PROPH/DIAG INJ IV PUSH: CPT

## 2023-09-18 PROCEDURE — 2580000003 HC RX 258: Performed by: INTERNAL MEDICINE

## 2023-09-18 PROCEDURE — 6360000002 HC RX W HCPCS: Performed by: INTERNAL MEDICINE

## 2023-09-18 RX ORDER — ZOLEDRONIC ACID 5 MG/100ML
5 INJECTION, SOLUTION INTRAVENOUS ONCE
Status: COMPLETED | OUTPATIENT
Start: 2023-09-18 | End: 2023-09-18

## 2023-09-18 RX ORDER — SODIUM CHLORIDE 9 MG/ML
INJECTION, SOLUTION INTRAVENOUS CONTINUOUS
OUTPATIENT
Start: 2024-09-15

## 2023-09-18 RX ORDER — DIPHENHYDRAMINE HYDROCHLORIDE 50 MG/ML
50 INJECTION INTRAMUSCULAR; INTRAVENOUS
OUTPATIENT
Start: 2024-09-15

## 2023-09-18 RX ORDER — SODIUM CHLORIDE 9 MG/ML
5-250 INJECTION, SOLUTION INTRAVENOUS PRN
OUTPATIENT
Start: 2024-09-15

## 2023-09-18 RX ORDER — EPINEPHRINE 1 MG/ML
0.3 INJECTION, SOLUTION, CONCENTRATE INTRAVENOUS PRN
OUTPATIENT
Start: 2024-09-15

## 2023-09-18 RX ORDER — HEPARIN 100 UNIT/ML
500 SYRINGE INTRAVENOUS PRN
OUTPATIENT
Start: 2024-09-15

## 2023-09-18 RX ORDER — ZOLEDRONIC ACID 5 MG/100ML
5 INJECTION, SOLUTION INTRAVENOUS ONCE
OUTPATIENT
Start: 2024-09-15 | End: 2024-09-15

## 2023-09-18 RX ORDER — ACETAMINOPHEN 325 MG/1
650 TABLET ORAL
OUTPATIENT
Start: 2024-09-15

## 2023-09-18 RX ORDER — ONDANSETRON 2 MG/ML
8 INJECTION INTRAMUSCULAR; INTRAVENOUS
OUTPATIENT
Start: 2024-09-15

## 2023-09-18 RX ORDER — SODIUM CHLORIDE 9 MG/ML
5-250 INJECTION, SOLUTION INTRAVENOUS PRN
Status: DISCONTINUED | OUTPATIENT
Start: 2023-09-18 | End: 2023-09-19 | Stop reason: HOSPADM

## 2023-09-18 RX ORDER — SODIUM CHLORIDE 0.9 % (FLUSH) 0.9 %
5-40 SYRINGE (ML) INJECTION PRN
Status: DISCONTINUED | OUTPATIENT
Start: 2023-09-18 | End: 2023-09-19 | Stop reason: HOSPADM

## 2023-09-18 RX ORDER — SODIUM CHLORIDE 0.9 % (FLUSH) 0.9 %
5-40 SYRINGE (ML) INJECTION PRN
OUTPATIENT
Start: 2024-09-15

## 2023-09-18 RX ORDER — ALBUTEROL SULFATE 90 UG/1
4 AEROSOL, METERED RESPIRATORY (INHALATION) PRN
OUTPATIENT
Start: 2024-09-15

## 2023-09-18 RX ADMIN — ZOLEDRONIC ACID 5 MG: 0.05 INJECTION, SOLUTION INTRAVENOUS at 15:20

## 2023-09-18 RX ADMIN — SODIUM CHLORIDE, PRESERVATIVE FREE 10 ML: 5 INJECTION INTRAVENOUS at 15:13

## 2023-09-18 RX ADMIN — SODIUM CHLORIDE, PRESERVATIVE FREE 10 ML: 5 INJECTION INTRAVENOUS at 15:36

## 2023-09-18 ASSESSMENT — PAIN SCALES - GENERAL: PAINLEVEL_OUTOF10: 5

## 2023-09-18 ASSESSMENT — PAIN DESCRIPTION - LOCATION: LOCATION: BACK

## 2023-09-18 ASSESSMENT — PAIN DESCRIPTION - DESCRIPTORS: DESCRIPTORS: ACHING

## 2023-10-02 RX ORDER — HYDROCORTISONE 5 MG/1
TABLET ORAL
Qty: 270 TABLET | Refills: 3 | Status: SHIPPED | OUTPATIENT
Start: 2023-10-02

## 2023-11-20 RX ORDER — DESMOPRESSIN ACETATE 0.1 MG/1
TABLET ORAL
Qty: 90 TABLET | Refills: 3 | Status: SHIPPED | OUTPATIENT
Start: 2023-11-20

## 2024-01-12 RX ORDER — CABERGOLINE 0.5 MG/1
TABLET ORAL
Qty: 24 TABLET | Refills: 3 | Status: SHIPPED | OUTPATIENT
Start: 2024-01-12

## 2024-01-12 RX ORDER — LEVOTHYROXINE SODIUM 0.07 MG/1
TABLET ORAL
Qty: 90 TABLET | Refills: 3 | Status: SHIPPED | OUTPATIENT
Start: 2024-01-12

## 2024-01-12 RX ORDER — DESMOPRESSIN ACETATE 0.1 MG/1
TABLET ORAL
Qty: 90 TABLET | Refills: 3 | Status: SHIPPED | OUTPATIENT
Start: 2024-01-12

## 2024-01-12 RX ORDER — HYDROCORTISONE 5 MG/1
TABLET ORAL
Qty: 270 TABLET | Refills: 3 | Status: SHIPPED | OUTPATIENT
Start: 2024-01-12

## 2024-01-12 NOTE — TELEPHONE ENCOUNTER
Pt LVM stating she has new insurance and will need for all prescriptions sent to [facility].

## 2024-08-07 DIAGNOSIS — E27.40 ADRENAL INSUFFICIENCY (HCC): ICD-10-CM

## 2024-08-07 DIAGNOSIS — E23.6 PITUITARY CYST (HCC): ICD-10-CM

## 2024-08-07 DIAGNOSIS — E23.2 DIABETES INSIPIDUS (HCC): ICD-10-CM

## 2024-08-07 DIAGNOSIS — E03.8 SECONDARY HYPOTHYROIDISM: ICD-10-CM

## 2024-08-13 LAB
BUN SERPL-MCNC: 23 MG/DL (ref 8–27)
BUN/CREAT SERPL: 22 (ref 12–28)
CALCIUM SERPL-MCNC: 9.5 MG/DL (ref 8.7–10.3)
CHLORIDE SERPL-SCNC: 101 MMOL/L (ref 96–106)
CO2 SERPL-SCNC: 26 MMOL/L (ref 20–29)
CREAT SERPL-MCNC: 1.06 MG/DL (ref 0.57–1)
EGFRCR SERPLBLD CKD-EPI 2021: 52 ML/MIN/1.73
GLUCOSE SERPL-MCNC: 103 MG/DL (ref 70–99)
POTASSIUM SERPL-SCNC: 3.9 MMOL/L (ref 3.5–5.2)
PROLACTIN SERPL-MCNC: 0.1 NG/ML (ref 3.6–32)
REPORT: NORMAL
SODIUM SERPL-SCNC: 141 MMOL/L (ref 134–144)
T4 FREE SERPL-MCNC: 1.61 NG/DL (ref 0.82–1.77)

## 2024-08-19 ENCOUNTER — OFFICE VISIT (OUTPATIENT)
Age: 84
End: 2024-08-19
Payer: MEDICARE

## 2024-08-19 VITALS
SYSTOLIC BLOOD PRESSURE: 148 MMHG | HEIGHT: 62 IN | DIASTOLIC BLOOD PRESSURE: 80 MMHG | WEIGHT: 116.4 LBS | HEART RATE: 78 BPM | BODY MASS INDEX: 21.42 KG/M2

## 2024-08-19 DIAGNOSIS — E23.6 PITUITARY CYST (HCC): Primary | ICD-10-CM

## 2024-08-19 DIAGNOSIS — E27.40 ADRENAL INSUFFICIENCY (HCC): ICD-10-CM

## 2024-08-19 DIAGNOSIS — E23.2 DIABETES INSIPIDUS (HCC): ICD-10-CM

## 2024-08-19 DIAGNOSIS — M81.0 AGE-RELATED OSTEOPOROSIS WITHOUT CURRENT PATHOLOGICAL FRACTURE: ICD-10-CM

## 2024-08-19 DIAGNOSIS — E03.8 SECONDARY HYPOTHYROIDISM: ICD-10-CM

## 2024-08-19 PROCEDURE — 1123F ACP DISCUSS/DSCN MKR DOCD: CPT | Performed by: INTERNAL MEDICINE

## 2024-08-19 PROCEDURE — G2211 COMPLEX E/M VISIT ADD ON: HCPCS | Performed by: INTERNAL MEDICINE

## 2024-08-19 PROCEDURE — 99214 OFFICE O/P EST MOD 30 MIN: CPT | Performed by: INTERNAL MEDICINE

## 2024-08-19 NOTE — PATIENT INSTRUCTIONS
1) Your free T4 (thyroid test) is perfect so I will keep your dose the same of levothyroxine.    2) Your electrolytes (sodium, potassium, and calcium) are all normal.  Keep your dose of hydrocortisone the same.    3) Your prolactin is appropriately low at 0.1 so stay on 1 tab 2x/week of cabergoline.    4) I have placed the order for Reclast so the infusion center should be able to see this and someone should contact you within the next 2 business days to schedule your appointment after 9/17/24. If you have not heard from them by then, please call [phone] for [facility] to get this scheduled.  Thanks.    5) I placed an order for your repeat bone density scan.  You should receive a call to have this scheduled.  However, if you have not heard from the  in the next few days, please call the Patient Care team at [phone] to schedule this appointment at your earliest convenience to be done prior to your upcoming visit with me in 1 year.

## 2024-08-19 NOTE — PROGRESS NOTES
Chief Complaint   Patient presents with    Thyroid Problem     PCP and pharmacy confirmed     History of Present Illness: Pratima Linares is a 83 y.o. female here for follow up of thyroid.  Weight down 1 lbs since last visit in 8/23.  It turns out last year Pratibha, who was with her, is her daughter, not daughter-in-law and today, Funmilayo, her other daughter is with her.  No major change to health since last visit.  Did receive her Reclast in 9/23 and didn't have any adverse reactions.  No falls or fractures.  No new headaches or change in vision.  No dizziness with standing or abd pain or n/v.  Compliant with HC 10 mg in am and 5 mg in pm.  Compliant with levothyroxine 75 mcg daily.  No change in skin, hair or nails or bowels or temperature.  Overall energy is about the same and still has some fatigue.  Compliant with DDAVP and no increase in thirst or urination.      Current Outpatient Medications   Medication Sig    desmopressin (DDAVP) 0.1 MG tablet TAKE 1 TABLET NIGHTLY. REPLACES NASAL SPRAY    hydrocortisone (CORTEF) 5 MG tablet TAKE 2 TABLETS EVERY MORNING  AND TAKE 1 TABLET  BETWEEN 4 AND 5 PM    levothyroxine (SYNTHROID) 75 MCG tablet TAKE 1 TABLET EVERY DAY IN THE MORNING  BEFORE  BREAKFAST    cabergoline (DOSTINEX) 0.5 MG tablet TAKE 1 TABLET TWICE WEEKLY    acetaminophen (TYLENOL) 500 MG tablet Take by mouth as needed    Cholecalciferol 50 MCG (2000 UT) TABS Take by mouth daily    flecainide (TAMBOCOR) 50 MG tablet Take 1 tablet by mouth 2 times daily    furosemide (LASIX) 20 MG tablet Take 1 tablet by mouth daily     No current facility-administered medications for this visit.     Allergies   Allergen Reactions    Amoxicillin Hives       Review of Systems: PER HPI    Physical Examination:  Blood pressure (!) 148/80, pulse 78, height 1.575 m (5' 2\"), weight 52.8 kg (116 lb 6.4 oz).  General: pleasant, no distress, good eye contact   Neck: (+) left carotid bruit  Cardiovascular: regular, normal rate, nl

## 2024-09-30 ENCOUNTER — HOSPITAL ENCOUNTER (OUTPATIENT)
Facility: HOSPITAL | Age: 84
Setting detail: INFUSION SERIES
Discharge: HOME | End: 2024-09-30
Payer: MEDICARE

## 2024-09-30 VITALS
TEMPERATURE: 98 F | RESPIRATION RATE: 16 BRPM | SYSTOLIC BLOOD PRESSURE: 151 MMHG | BODY MASS INDEX: 21.2 KG/M2 | DIASTOLIC BLOOD PRESSURE: 67 MMHG | WEIGHT: 115.2 LBS | HEIGHT: 62 IN | OXYGEN SATURATION: 100 % | HEART RATE: 64 BPM

## 2024-09-30 DIAGNOSIS — M81.0 AGE-RELATED OSTEOPOROSIS WITHOUT CURRENT PATHOLOGICAL FRACTURE: Primary | ICD-10-CM

## 2024-09-30 LAB
ANION GAP BLD CALC-SCNC: 8 MMOL/L (ref 10–20)
CA-I BLD-MCNC: 1.16 MMOL/L (ref 1.15–1.33)
CHLORIDE BLD-SCNC: 107 MMOL/L (ref 98–107)
CO2 BLD-SCNC: 27 MMOL/L (ref 21–32)
CREAT BLD-MCNC: 0.76 MG/DL (ref 0.6–1.3)
GLUCOSE BLD-MCNC: 91 MG/DL (ref 74–99)
POTASSIUM BLD-SCNC: 4 MMOL/L (ref 3.5–5.1)
SERVICE CMNT-IMP: ABNORMAL
SODIUM BLD-SCNC: 142 MMOL/L (ref 136–145)

## 2024-09-30 PROCEDURE — 96374 THER/PROPH/DIAG INJ IV PUSH: CPT

## 2024-09-30 PROCEDURE — 2580000003 HC RX 258: Performed by: INTERNAL MEDICINE

## 2024-09-30 PROCEDURE — 80047 BASIC METABLC PNL IONIZED CA: CPT

## 2024-09-30 PROCEDURE — 6360000002 HC RX W HCPCS: Performed by: INTERNAL MEDICINE

## 2024-09-30 RX ORDER — SODIUM CHLORIDE 9 MG/ML
INJECTION, SOLUTION INTRAVENOUS CONTINUOUS
OUTPATIENT
Start: 2025-09-14

## 2024-09-30 RX ORDER — SODIUM CHLORIDE 9 MG/ML
5-250 INJECTION, SOLUTION INTRAVENOUS PRN
OUTPATIENT
Start: 2025-09-14

## 2024-09-30 RX ORDER — ONDANSETRON 2 MG/ML
8 INJECTION INTRAMUSCULAR; INTRAVENOUS
OUTPATIENT
Start: 2025-09-14

## 2024-09-30 RX ORDER — SODIUM CHLORIDE 9 MG/ML
5-250 INJECTION, SOLUTION INTRAVENOUS PRN
Status: DISCONTINUED | OUTPATIENT
Start: 2024-09-30 | End: 2024-10-01 | Stop reason: HOSPADM

## 2024-09-30 RX ORDER — DIPHENHYDRAMINE HYDROCHLORIDE 50 MG/ML
50 INJECTION INTRAMUSCULAR; INTRAVENOUS
OUTPATIENT
Start: 2025-09-14

## 2024-09-30 RX ORDER — SODIUM CHLORIDE 0.9 % (FLUSH) 0.9 %
5-40 SYRINGE (ML) INJECTION PRN
OUTPATIENT
Start: 2025-09-14

## 2024-09-30 RX ORDER — SODIUM CHLORIDE 0.9 % (FLUSH) 0.9 %
5-40 SYRINGE (ML) INJECTION PRN
Status: DISCONTINUED | OUTPATIENT
Start: 2024-09-30 | End: 2024-10-01 | Stop reason: HOSPADM

## 2024-09-30 RX ORDER — ZOLEDRONIC ACID 0.05 MG/ML
5 INJECTION, SOLUTION INTRAVENOUS ONCE
OUTPATIENT
Start: 2025-09-14 | End: 2025-09-14

## 2024-09-30 RX ORDER — EPINEPHRINE 1 MG/ML
0.3 INJECTION, SOLUTION INTRAMUSCULAR; SUBCUTANEOUS PRN
OUTPATIENT
Start: 2025-09-14

## 2024-09-30 RX ORDER — ACETAMINOPHEN 325 MG/1
650 TABLET ORAL
OUTPATIENT
Start: 2025-09-14

## 2024-09-30 RX ORDER — HEPARIN 100 UNIT/ML
500 SYRINGE INTRAVENOUS PRN
OUTPATIENT
Start: 2025-09-14

## 2024-09-30 RX ORDER — ZOLEDRONIC ACID 0.05 MG/ML
5 INJECTION, SOLUTION INTRAVENOUS ONCE
Status: COMPLETED | OUTPATIENT
Start: 2024-09-30 | End: 2024-09-30

## 2024-09-30 RX ORDER — ALBUTEROL SULFATE 90 UG/1
4 INHALANT RESPIRATORY (INHALATION) PRN
OUTPATIENT
Start: 2025-09-14

## 2024-09-30 RX ADMIN — SODIUM CHLORIDE, PRESERVATIVE FREE 10 ML: 5 INJECTION INTRAVENOUS at 11:20

## 2024-09-30 RX ADMIN — ZOLEDRONIC ACID 5 MG: 0.05 INJECTION, SOLUTION INTRAVENOUS at 11:49

## 2024-09-30 RX ADMIN — SODIUM CHLORIDE, PRESERVATIVE FREE 10 ML: 5 INJECTION INTRAVENOUS at 12:06

## 2024-09-30 RX ADMIN — SODIUM CHLORIDE 25 ML/HR: 9 INJECTION, SOLUTION INTRAVENOUS at 11:48

## 2024-09-30 NOTE — PROGRESS NOTES
[facility] Progress Note    1100- Pt admit to [facility] for Reclast in stable condition. Assessment completed. Patient denies any complaints today.  Patient denies any recent or future dental procedures.    24G PIV established in right AC with positive blood return noted.    Patient Vitals for the past 12 hrs:   Temp Pulse Resp BP SpO2   09/30/24 1113 98 °F (36.7 °C) 64 16 (!) 151/67 100 %     The following medications administered:  Medications Administered         0.9 % sodium chloride infusion Admin Date  09/30/2024 Action  New Bag Dose  25 mL/hr Rate  25 mL/hr Route  IntraVENous Documented By  Otoniel Poole RN        sodium chloride flush 0.9 % injection 5-40 mL Admin Date  09/30/2024 Action  Given Dose  10 mL Rate   Route  IntraVENous Documented By  Otoniel Poole RN        sodium chloride flush 0.9 % injection 5-40 mL Admin Date  09/30/2024 Action  Given Dose  10 mL Rate   Route  IntraVENous Documented By  Otoniel Poole RN        zoledronic acid (RECLAST) 5 mg/100 mL infusion Admin Date  09/30/2024 Action  New Bag Dose  5 mg Rate  400 mL/hr Route  IntraVENous Documented By  Otoniel Poole RN          Pt tolerated well, no s/s of adverse reaction noted. PIV flushed and discontinued. Site wrapped in gauze and coban.    Pt provided with education on possible side effects of medication along with discharge instructions. Pt verbalized understanding.      1210- D/C home in no distress.     Pt aware of next appointment scheduled for:     Future Appointments   Date Time Provider Department Center   8/25/2025  1:30 PM Didier Trimble MD [id number]

## 2024-12-17 RX ORDER — LEVOTHYROXINE SODIUM 75 UG/1
TABLET ORAL
Qty: 90 TABLET | Refills: 3 | Status: SHIPPED | OUTPATIENT
Start: 2024-12-17

## 2025-02-14 RX ORDER — CABERGOLINE 0.5 MG/1
TABLET ORAL
Qty: 24 TABLET | Refills: 3 | Status: SHIPPED | OUTPATIENT
Start: 2025-02-14

## 2025-03-24 RX ORDER — HYDROCORTISONE 5 MG/1
TABLET ORAL
Qty: 270 TABLET | Refills: 3 | Status: SHIPPED | OUTPATIENT
Start: 2025-03-24

## 2025-03-24 RX ORDER — DESMOPRESSIN ACETATE 0.1 MG/1
TABLET ORAL
Qty: 90 TABLET | Refills: 3 | Status: SHIPPED | OUTPATIENT
Start: 2025-03-24

## 2025-07-07 ENCOUNTER — HOSPITAL ENCOUNTER (OUTPATIENT)
Facility: HOSPITAL | Age: 85
Discharge: HOME | End: 2025-07-10
Attending: INTERNAL MEDICINE
Payer: MEDICARE

## 2025-07-07 DIAGNOSIS — M81.0 AGE-RELATED OSTEOPOROSIS WITHOUT CURRENT PATHOLOGICAL FRACTURE: ICD-10-CM

## 2025-07-07 PROCEDURE — 77080 DXA BONE DENSITY AXIAL: CPT

## 2025-08-02 DIAGNOSIS — M81.0 AGE-RELATED OSTEOPOROSIS WITHOUT CURRENT PATHOLOGICAL FRACTURE: ICD-10-CM

## 2025-08-02 DIAGNOSIS — E27.40 ADRENAL INSUFFICIENCY: ICD-10-CM

## 2025-08-02 DIAGNOSIS — E03.8 SECONDARY HYPOTHYROIDISM: ICD-10-CM

## 2025-08-02 DIAGNOSIS — E23.6 PITUITARY CYST: ICD-10-CM

## 2025-08-02 DIAGNOSIS — E23.2 DIABETES INSIPIDUS: ICD-10-CM

## 2025-08-21 LAB
BUN SERPL-MCNC: 23 MG/DL (ref 8–27)
BUN/CREAT SERPL: 26 (ref 12–28)
CALCIUM SERPL-MCNC: 9.6 MG/DL (ref 8.7–10.3)
CHLORIDE SERPL-SCNC: 100 MMOL/L (ref 96–106)
CO2 SERPL-SCNC: 25 MMOL/L (ref 20–29)
CREAT SERPL-MCNC: 0.9 MG/DL (ref 0.57–1)
EGFRCR SERPLBLD CKD-EPI 2021: 63 ML/MIN/1.73
GLUCOSE SERPL-MCNC: 99 MG/DL (ref 70–99)
POTASSIUM SERPL-SCNC: 4 MMOL/L (ref 3.5–5.2)
PROLACTIN SERPL-MCNC: <0.1 NG/ML (ref 3.6–32)
SODIUM SERPL-SCNC: 139 MMOL/L (ref 134–144)
T4 FREE SERPL-MCNC: 1.49 NG/DL (ref 0.82–1.77)

## (undated) DEVICE — SOLIDIFIER MEDC 1200ML -- CONVERT TO 356117

## (undated) DEVICE — Device: Brand: MEDEX

## (undated) DEVICE — (D)SYR 10ML 1/5ML GRAD NSAF -- PKGING CHANGE USE ITEM 338027

## (undated) DEVICE — TOWEL 4 PLY TISS 19X30 SUE WHT

## (undated) DEVICE — SYR 3ML LL TIP 1/10ML GRAD --

## (undated) DEVICE — Device

## (undated) DEVICE — 1200 GUARD II KIT W/5MM TUBE W/O VAC TUBE: Brand: GUARDIAN

## (undated) DEVICE — NEONATAL-ADULT SPO2 SENSOR: Brand: NELLCOR

## (undated) DEVICE — BASIN EMESIS 500CC ROSE 250/CS 60/PLT: Brand: MEDEGEN MEDICAL PRODUCTS, LLC

## (undated) DEVICE — Z DISCONTINUED PER MEDLINE LINE GAS SAMPLING O2/CO2 LNG AD 13 FT NSL W/ TBNG FILTERLINE

## (undated) DEVICE — SET ADMIN 16ML TBNG L100IN 2 Y INJ SITE IV PIGGY BK DISP

## (undated) DEVICE — NEEDLE HYPO 18GA L1.5IN PNK S STL HUB POLYPR SHLD REG BVL

## (undated) DEVICE — CATH IV AUTOGRD BC BLU 22GA 25 -- INSYTE